# Patient Record
Sex: FEMALE | Race: WHITE | Employment: FULL TIME | URBAN - METROPOLITAN AREA
[De-identification: names, ages, dates, MRNs, and addresses within clinical notes are randomized per-mention and may not be internally consistent; named-entity substitution may affect disease eponyms.]

---

## 2017-08-16 ENCOUNTER — TELEPHONE (OUTPATIENT)
Dept: BARIATRICS/WEIGHT MGMT | Age: 37
End: 2017-08-16

## 2021-09-22 VITALS — HEIGHT: 68 IN | WEIGHT: 275 LBS | BODY MASS INDEX: 41.68 KG/M2

## 2021-09-22 RX ORDER — SODIUM CHLORIDE, SODIUM LACTATE, POTASSIUM CHLORIDE, CALCIUM CHLORIDE 600; 310; 30; 20 MG/100ML; MG/100ML; MG/100ML; MG/100ML
INJECTION, SOLUTION INTRAVENOUS CONTINUOUS
Status: CANCELLED | OUTPATIENT
Start: 2021-09-22

## 2021-09-22 RX ORDER — LOSARTAN POTASSIUM 25 MG/1
25 TABLET ORAL DAILY
COMMUNITY

## 2021-09-22 RX ORDER — IBUPROFEN 800 MG/1
800 TABLET ORAL EVERY 6 HOURS PRN
Status: ON HOLD | COMMUNITY
End: 2021-10-07 | Stop reason: HOSPADM

## 2021-09-22 RX ORDER — DILTIAZEM HYDROCHLORIDE 240 MG/1
240 CAPSULE, COATED, EXTENDED RELEASE ORAL DAILY
COMMUNITY

## 2021-09-22 RX ORDER — TIZANIDINE HYDROCHLORIDE 4 MG/1
4 CAPSULE, GELATIN COATED ORAL NIGHTLY PRN
Status: ON HOLD | COMMUNITY
End: 2021-10-07 | Stop reason: HOSPADM

## 2021-09-22 RX ORDER — HYDROXYZINE HYDROCHLORIDE 25 MG/1
25 TABLET, FILM COATED ORAL EVERY 6 HOURS PRN
COMMUNITY

## 2021-09-22 RX ORDER — FUROSEMIDE 40 MG/1
40 TABLET ORAL DAILY
COMMUNITY

## 2021-09-22 RX ORDER — LIDOCAINE HYDROCHLORIDE 10 MG/ML
0.5 INJECTION, SOLUTION EPIDURAL; INFILTRATION; INTRACAUDAL; PERINEURAL ONCE
Status: CANCELLED | OUTPATIENT
Start: 2021-09-22 | End: 2021-09-22

## 2021-09-22 RX ORDER — SERTRALINE HYDROCHLORIDE 100 MG/1
100 TABLET, FILM COATED ORAL DAILY
COMMUNITY

## 2021-09-22 RX ORDER — BUSPIRONE HYDROCHLORIDE 10 MG/1
10 TABLET ORAL 2 TIMES DAILY
COMMUNITY

## 2021-09-22 RX ORDER — FLECAINIDE ACETATE 100 MG/1
100 TABLET ORAL 2 TIMES DAILY
COMMUNITY

## 2021-09-22 NOTE — PROGRESS NOTES
Name_______________________________________Printed:____________________  Date and time of surgery___10/5/21 @ 0730_____________________Arrival Time:__0600 main hosp______________   1. The instructions given regarding when and if a patient needs to stop oral intake prior to surgery varies. Follow the specific instructions you were given                  _x__Nothing to eat or to drink after Midnight the night before.                   ____Carbo loading or ERAS instructions will be given to select patients-if you have been given those instructions -please do the following                           The evening before your surgery after dinner before midnight drink 40 ounces of gatorade. If you are diabetic use sugar free. The morning of surgery drink 40 ounces of water. This needs to be finished 3 hours prior to your surgery start time. 2. Take the following pills with a small sip of water on the morning of surgery___prednisone, synthroid, prilosec, cardizem,  flecanide________________________________________________                  Do not take blood pressure medications ending in pril or sartan the cira prior to surgery or the morning of surgery_   3. Aspirin, Ibuprofen, Advil, Naproxen, Vitamin E and other Anti-inflammatory products and supplements should be stopped for 5 -7days before surgery or as directed by your physician. 4. Check with your Doctor regarding stopping Plavix, Coumadin,Eliquis, Lovenox,Effient,Pradaxa,Xarelto, Fragmin or other blood thinners and follow their instructions. 5. Do not smoke, and do not drink any alcoholic beverages 24 hours prior to surgery. This includes NA Beer. Refrain from the usage of any recreational drugs. 6. You may brush your teeth and gargle the morning of surgery. DO NOT SWALLOW WATER   7. You MUST make arrangements for a responsible adult to stay on site while you are here and take you home after your surgery.  You will not be allowed to leave alone or drive yourself insurance card. 19.  Visit our web site for additional information:  [email or web address]              20.During flu season no children under the age of 15 are permitted in the hospital for the safety of all patients. 21. If you take a long acting insulin in the evening only  take half of your usual  dose the night  before your procedure              22. If you use a c-pap please bring DOS if staying overnight,             23.For your convenience [facility] has a pharmacy on site to fill your prescriptions. 24. If you use oxygen and have a portable tank please bring it  with you the DOS             25. Bring a complete list of all your medications with name and dose include any supplements. 26. Other__________________________________________   *Please call pre admission testing if you any further questions   Kathy Gentile   [address]    [place] [address] [place]  [phone]   [facility] [address] MIKE REDDY   [phone]           COVID TESTING    __x_ Covid test to be done 6 days prior to scheduled surgery -patient aware they are REQUIRED to bring a copy of the negative result DOS-if they receive a positive result to notify their surgeon         If known - indicate where patient is getting covid test done ________9/29 mff  ___________________________________________________    ___ Rapid - DOS    ___ Other___________________________________      Maral Sequeira POLICY(subject to change)    There is a one visitor policy at [facility] for all surgeries and endoscopies. Whether the visitor can stay or will be asked to wait in the car will depend on the current policy and if social distancing can be maintained. The policy is subject to change at any time. Please make sure the visitor has a cell phone that is on,charged and able to accept calls, as this may be the way that the staff communicates with them. Pain management is NO VISITOR policyThe patients ride is expected to remain in the car with a cell phone for communication. If the ride is leaving the hospital grounds please make sure they are back in time for pickup. Have the patient inform the staff on arrival what their rides plans are while the patient is in the facility. At the MAIN there is one visitor allowed. Please note that the visitor policy is subject to change. All above information reviewed with patient in person or by phone. Patient verbalizes understanding. All questions and concerns addressed.                                                                                                  Patient/Rep____pt________________                                                                                                                                    PRE OP INSTRUCTIONS

## 2021-09-29 ENCOUNTER — HOSPITAL ENCOUNTER (OUTPATIENT)
Age: 41
Setting detail: OUTPATIENT SURGERY
Discharge: HOME | End: 2021-09-29
Attending: NEUROLOGICAL SURGERY | Admitting: NEUROLOGICAL SURGERY
Payer: COMMERCIAL

## 2021-09-29 ENCOUNTER — HOSPITAL ENCOUNTER (OUTPATIENT)
Age: 41
Discharge: HOME | End: 2021-09-29
Attending: NEUROLOGICAL SURGERY
Payer: COMMERCIAL

## 2021-09-29 DIAGNOSIS — Z01.818 PREOP TESTING: Primary | ICD-10-CM

## 2021-09-29 DIAGNOSIS — Z01.818 PREOP TESTING: ICD-10-CM

## 2021-09-29 LAB
ABO/RH: NORMAL
ANION GAP SERPL CALCULATED.3IONS-SCNC: 17 MMOL/L (ref 3–16)
ANTIBODY SCREEN: NORMAL
APTT: 29.9 SEC (ref 26.2–38.6)
BUN BLDV-MCNC: 11 MG/DL (ref 7–20)
CALCIUM SERPL-MCNC: 9 MG/DL (ref 8.3–10.6)
CHLORIDE BLD-SCNC: 107 MMOL/L (ref 99–110)
CO2: 18 MMOL/L (ref 21–32)
CREAT SERPL-MCNC: 0.8 MG/DL (ref 0.6–1.1)
GFR AFRICAN AMERICAN: >60
GFR NON-AFRICAN AMERICAN: >60
GLUCOSE BLD-MCNC: 89 MG/DL (ref 70–99)
HCT VFR BLD CALC: 37.3 % (ref 36–48)
HEMOGLOBIN: 11.8 G/DL (ref 12–16)
INR BLD: 0.9 (ref 0.88–1.12)
MCH RBC QN AUTO: 26.9 PG (ref 26–34)
MCHC RBC AUTO-ENTMCNC: 31.7 G/DL (ref 31–36)
MCV RBC AUTO: 85 FL (ref 80–100)
PDW BLD-RTO: 18.4 % (ref 12.4–15.4)
PLATELET # BLD: 337 K/UL (ref 135–450)
PMV BLD AUTO: 9.3 FL (ref 5–10.5)
POTASSIUM SERPL-SCNC: 4.2 MMOL/L (ref 3.5–5.1)
PROTHROMBIN TIME: 10.1 SEC (ref 9.9–12.7)
RBC # BLD: 4.38 M/UL (ref 4–5.2)
SODIUM BLD-SCNC: 142 MMOL/L (ref 136–145)
WBC # BLD: 6.1 K/UL (ref 4–11)

## 2021-09-29 PROCEDURE — 86900 BLOOD TYPING SEROLOGIC ABO: CPT

## 2021-09-29 PROCEDURE — U0003 INFECTIOUS AGENT DETECTION BY NUCLEIC ACID (DNA OR RNA); SEVERE ACUTE RESPIRATORY SYNDROME CORONAVIRUS 2 (SARS-COV-2) (CORONAVIRUS DISEASE [COVID-19]), AMPLIFIED PROBE TECHNIQUE, MAKING USE OF HIGH THROUGHPUT TECHNOLOGIES AS DESCRIBED BY CMS-2020-01-R: HCPCS

## 2021-09-29 PROCEDURE — 86901 BLOOD TYPING SEROLOGIC RH(D): CPT

## 2021-09-29 PROCEDURE — U0005 INFEC AGEN DETEC AMPLI PROBE: HCPCS

## 2021-09-29 PROCEDURE — 86850 RBC ANTIBODY SCREEN: CPT

## 2021-09-29 PROCEDURE — 85730 THROMBOPLASTIN TIME PARTIAL: CPT

## 2021-09-29 PROCEDURE — 80048 BASIC METABOLIC PNL TOTAL CA: CPT

## 2021-09-29 PROCEDURE — 85027 COMPLETE CBC AUTOMATED: CPT

## 2021-09-29 PROCEDURE — 36415 COLL VENOUS BLD VENIPUNCTURE: CPT

## 2021-09-29 PROCEDURE — 85610 PROTHROMBIN TIME: CPT

## 2021-09-30 LAB — SARS-COV-2, PCR: NOT DETECTED

## 2021-10-05 ENCOUNTER — HOSPITAL ENCOUNTER (INPATIENT)
Age: 41
LOS: 2 days | Discharge: HOME | DRG: 454 | End: 2021-10-07
Attending: NEUROLOGICAL SURGERY | Admitting: NEUROLOGICAL SURGERY
Payer: COMMERCIAL

## 2021-10-05 ENCOUNTER — APPOINTMENT (OUTPATIENT)
Dept: GENERAL RADIOLOGY | Age: 41
DRG: 454 | End: 2021-10-05
Attending: NEUROLOGICAL SURGERY
Payer: COMMERCIAL

## 2021-10-05 ENCOUNTER — ANESTHESIA EVENT (OUTPATIENT)
Dept: OPERATING ROOM | Age: 41
DRG: 454 | End: 2021-10-05
Payer: COMMERCIAL

## 2021-10-05 ENCOUNTER — ANESTHESIA (OUTPATIENT)
Dept: OPERATING ROOM | Age: 41
DRG: 454 | End: 2021-10-05
Payer: COMMERCIAL

## 2021-10-05 VITALS
RESPIRATION RATE: 15 BRPM | SYSTOLIC BLOOD PRESSURE: 145 MMHG | OXYGEN SATURATION: 100 % | DIASTOLIC BLOOD PRESSURE: 87 MMHG | TEMPERATURE: 98.2 F

## 2021-10-05 DIAGNOSIS — M43.16 SPONDYLOLISTHESIS AT L4-L5 LEVEL: Primary | ICD-10-CM

## 2021-10-05 PROBLEM — I10 HTN (HYPERTENSION): Status: ACTIVE | Noted: 2021-10-05

## 2021-10-05 LAB
ABO/RH: NORMAL
ANTIBODY SCREEN: NORMAL
HCG(URINE) PREGNANCY TEST: NEGATIVE

## 2021-10-05 PROCEDURE — 86901 BLOOD TYPING SEROLOGIC RH(D): CPT

## 2021-10-05 PROCEDURE — 72100 X-RAY EXAM L-S SPINE 2/3 VWS: CPT

## 2021-10-05 PROCEDURE — 0SB20ZZ EXCISION OF LUMBAR VERTEBRAL DISC, OPEN APPROACH: ICD-10-PCS | Performed by: NEUROLOGICAL SURGERY

## 2021-10-05 PROCEDURE — 6360000002 HC RX W HCPCS: Performed by: NEUROLOGICAL SURGERY

## 2021-10-05 PROCEDURE — 2500000003 HC RX 250 WO HCPCS: Performed by: NEUROLOGICAL SURGERY

## 2021-10-05 PROCEDURE — 1200000000 HC SEMI PRIVATE

## 2021-10-05 PROCEDURE — 0SG0071 FUSION OF LUMBAR VERTEBRAL JOINT WITH AUTOLOGOUS TISSUE SUBSTITUTE, POSTERIOR APPROACH, POSTERIOR COLUMN, OPEN APPROACH: ICD-10-PCS | Performed by: NEUROLOGICAL SURGERY

## 2021-10-05 PROCEDURE — 0SG00AJ FUSION OF LUMBAR VERTEBRAL JOINT WITH INTERBODY FUSION DEVICE, POSTERIOR APPROACH, ANTERIOR COLUMN, OPEN APPROACH: ICD-10-PCS | Performed by: NEUROLOGICAL SURGERY

## 2021-10-05 PROCEDURE — 01NB0ZZ RELEASE LUMBAR NERVE, OPEN APPROACH: ICD-10-PCS | Performed by: NEUROLOGICAL SURGERY

## 2021-10-05 PROCEDURE — 86850 RBC ANTIBODY SCREEN: CPT

## 2021-10-05 PROCEDURE — 2580000003 HC RX 258: Performed by: NEUROLOGICAL SURGERY

## 2021-10-05 PROCEDURE — 7100000001 HC PACU RECOVERY - ADDTL 15 MIN: Performed by: NEUROLOGICAL SURGERY

## 2021-10-05 PROCEDURE — 3700000000 HC ANESTHESIA ATTENDED CARE: Performed by: NEUROLOGICAL SURGERY

## 2021-10-05 PROCEDURE — 6370000000 HC RX 637 (ALT 250 FOR IP): Performed by: NURSE ANESTHETIST, CERTIFIED REGISTERED

## 2021-10-05 PROCEDURE — 6370000000 HC RX 637 (ALT 250 FOR IP): Performed by: NEUROLOGICAL SURGERY

## 2021-10-05 PROCEDURE — 2500000003 HC RX 250 WO HCPCS: Performed by: NURSE ANESTHETIST, CERTIFIED REGISTERED

## 2021-10-05 PROCEDURE — 2720000010 HC SURG SUPPLY STERILE: Performed by: NEUROLOGICAL SURGERY

## 2021-10-05 PROCEDURE — 6360000002 HC RX W HCPCS: Performed by: INTERNAL MEDICINE

## 2021-10-05 PROCEDURE — 84703 CHORIONIC GONADOTROPIN ASSAY: CPT

## 2021-10-05 PROCEDURE — 6360000002 HC RX W HCPCS: Performed by: FAMILY MEDICINE

## 2021-10-05 PROCEDURE — 94760 N-INVAS EAR/PLS OXIMETRY 1: CPT

## 2021-10-05 PROCEDURE — C1713 ANCHOR/SCREW BN/BN,TIS/BN: HCPCS | Performed by: NEUROLOGICAL SURGERY

## 2021-10-05 PROCEDURE — 3600000014 HC SURGERY LEVEL 4 ADDTL 15MIN: Performed by: NEUROLOGICAL SURGERY

## 2021-10-05 PROCEDURE — 7100000000 HC PACU RECOVERY - FIRST 15 MIN: Performed by: NEUROLOGICAL SURGERY

## 2021-10-05 PROCEDURE — 2709999900 HC NON-CHARGEABLE SUPPLY: Performed by: NEUROLOGICAL SURGERY

## 2021-10-05 PROCEDURE — 3600000004 HC SURGERY LEVEL 4 BASE: Performed by: NEUROLOGICAL SURGERY

## 2021-10-05 PROCEDURE — 2780000010 HC IMPLANT OTHER: Performed by: NEUROLOGICAL SURGERY

## 2021-10-05 PROCEDURE — 3700000001 HC ADD 15 MINUTES (ANESTHESIA): Performed by: NEUROLOGICAL SURGERY

## 2021-10-05 PROCEDURE — 36415 COLL VENOUS BLD VENIPUNCTURE: CPT

## 2021-10-05 PROCEDURE — C9290 INJ, BUPIVACAINE LIPOSOME: HCPCS | Performed by: NEUROLOGICAL SURGERY

## 2021-10-05 PROCEDURE — 3209999900 FLUORO FOR SURGICAL PROCEDURES

## 2021-10-05 PROCEDURE — 86900 BLOOD TYPING SEROLOGIC ABO: CPT

## 2021-10-05 PROCEDURE — 6360000002 HC RX W HCPCS: Performed by: NURSE ANESTHETIST, CERTIFIED REGISTERED

## 2021-10-05 PROCEDURE — 94150 VITAL CAPACITY TEST: CPT

## 2021-10-05 DEVICE — SCREW 54850046545 4.75VOYAGER ATS 6.5X45
Type: IMPLANTABLE DEVICE | Site: SPINE LUMBAR | Status: FUNCTIONAL
Brand: CD HORIZON® SOLERA® VOYAGER™ SPINAL SYSTEM

## 2021-10-05 DEVICE — ROD 641003040 40MM CAPPED ROD 4.75MM CCM
Type: IMPLANTABLE DEVICE | Site: SPINE LUMBAR | Status: FUNCTIONAL
Brand: CD HORIZON® SOLERA® SPINAL SYSTEM

## 2021-10-05 DEVICE — SPACER 4001027 6 DEG 10X27
Type: IMPLANTABLE DEVICE | Site: SPINE LUMBAR | Status: FUNCTIONAL
Brand: CAPSTONE CONTROL™ SPINAL SYSTEM

## 2021-10-05 DEVICE — SET SCR SPNL DIA4.75MM POST THORLUM SACR TI PERC APPRCH CDH: Type: IMPLANTABLE DEVICE | Site: SPINE LUMBAR | Status: FUNCTIONAL

## 2021-10-05 DEVICE — DBM 7509115 MAG SINGLE 1 X 5CM
Type: IMPLANTABLE DEVICE | Site: SPINE LUMBAR | Status: FUNCTIONAL
Brand: MAGNIFUSE® BONE GRAFT

## 2021-10-05 RX ORDER — SODIUM CHLORIDE 0.9 % (FLUSH) 0.9 %
5-40 SYRINGE (ML) INJECTION PRN
Status: DISCONTINUED | OUTPATIENT
Start: 2021-10-05 | End: 2021-10-07 | Stop reason: HOSPADM

## 2021-10-05 RX ORDER — PHENYLEPHRINE HCL IN 0.9% NACL 1 MG/10 ML
SYRINGE (ML) INTRAVENOUS PRN
Status: DISCONTINUED | OUTPATIENT
Start: 2021-10-05 | End: 2021-10-05 | Stop reason: SDUPTHER

## 2021-10-05 RX ORDER — PROPOFOL 10 MG/ML
INJECTION, EMULSION INTRAVENOUS PRN
Status: DISCONTINUED | OUTPATIENT
Start: 2021-10-05 | End: 2021-10-05 | Stop reason: SDUPTHER

## 2021-10-05 RX ORDER — EPHEDRINE SULFATE/0.9% NACL/PF 50 MG/5 ML
SYRINGE (ML) INTRAVENOUS PRN
Status: DISCONTINUED | OUTPATIENT
Start: 2021-10-05 | End: 2021-10-05 | Stop reason: SDUPTHER

## 2021-10-05 RX ORDER — ONDANSETRON 8 MG/1
8 TABLET, ORALLY DISINTEGRATING ORAL EVERY 8 HOURS PRN
Status: DISCONTINUED | OUTPATIENT
Start: 2021-10-05 | End: 2021-10-07 | Stop reason: HOSPADM

## 2021-10-05 RX ORDER — LEVOTHYROXINE SODIUM 0.1 MG/1
100 TABLET ORAL DAILY
Status: DISCONTINUED | OUTPATIENT
Start: 2021-10-06 | End: 2021-10-07 | Stop reason: HOSPADM

## 2021-10-05 RX ORDER — SODIUM CHLORIDE, SODIUM LACTATE, POTASSIUM CHLORIDE, CALCIUM CHLORIDE 600; 310; 30; 20 MG/100ML; MG/100ML; MG/100ML; MG/100ML
INJECTION, SOLUTION INTRAVENOUS CONTINUOUS
Status: DISCONTINUED | OUTPATIENT
Start: 2021-10-05 | End: 2021-10-05

## 2021-10-05 RX ORDER — FUROSEMIDE 40 MG/1
40 TABLET ORAL DAILY
Status: DISCONTINUED | OUTPATIENT
Start: 2021-10-06 | End: 2021-10-07 | Stop reason: HOSPADM

## 2021-10-05 RX ORDER — MIDAZOLAM HYDROCHLORIDE 1 MG/ML
INJECTION INTRAMUSCULAR; INTRAVENOUS PRN
Status: DISCONTINUED | OUTPATIENT
Start: 2021-10-05 | End: 2021-10-05 | Stop reason: SDUPTHER

## 2021-10-05 RX ORDER — MAGNESIUM HYDROXIDE 1200 MG/15ML
LIQUID ORAL CONTINUOUS PRN
Status: COMPLETED | OUTPATIENT
Start: 2021-10-05 | End: 2021-10-05

## 2021-10-05 RX ORDER — VANCOMYCIN HYDROCHLORIDE 1 G/20ML
INJECTION, POWDER, LYOPHILIZED, FOR SOLUTION INTRAVENOUS
Status: COMPLETED | OUTPATIENT
Start: 2021-10-05 | End: 2021-10-05

## 2021-10-05 RX ORDER — FENTANYL CITRATE 50 UG/ML
INJECTION, SOLUTION INTRAMUSCULAR; INTRAVENOUS PRN
Status: DISCONTINUED | OUTPATIENT
Start: 2021-10-05 | End: 2021-10-05 | Stop reason: SDUPTHER

## 2021-10-05 RX ORDER — OXYCODONE HYDROCHLORIDE 5 MG/1
5 TABLET ORAL PRN
Status: DISCONTINUED | OUTPATIENT
Start: 2021-10-05 | End: 2021-10-05 | Stop reason: HOSPADM

## 2021-10-05 RX ORDER — PANTOPRAZOLE SODIUM 40 MG/1
40 TABLET, DELAYED RELEASE ORAL DAILY
Status: DISCONTINUED | OUTPATIENT
Start: 2021-10-06 | End: 2021-10-07 | Stop reason: HOSPADM

## 2021-10-05 RX ORDER — FENTANYL CITRATE 50 UG/ML
50 INJECTION, SOLUTION INTRAMUSCULAR; INTRAVENOUS EVERY 5 MIN PRN
Status: DISCONTINUED | OUTPATIENT
Start: 2021-10-05 | End: 2021-10-05 | Stop reason: HOSPADM

## 2021-10-05 RX ORDER — SUCCINYLCHOLINE/SOD CL,ISO/PF 200MG/10ML
SYRINGE (ML) INTRAVENOUS PRN
Status: DISCONTINUED | OUTPATIENT
Start: 2021-10-05 | End: 2021-10-05 | Stop reason: SDUPTHER

## 2021-10-05 RX ORDER — PREDNISONE 1 MG/1
5 TABLET ORAL DAILY
Status: DISCONTINUED | OUTPATIENT
Start: 2021-10-05 | End: 2021-10-07 | Stop reason: HOSPADM

## 2021-10-05 RX ORDER — POLYETHYLENE GLYCOL 3350 17 G/17G
17 POWDER, FOR SOLUTION ORAL DAILY
Status: DISCONTINUED | OUTPATIENT
Start: 2021-10-05 | End: 2021-10-07 | Stop reason: HOSPADM

## 2021-10-05 RX ORDER — KETAMINE HCL IN NACL, ISO-OSM 100MG/10ML
SYRINGE (ML) INJECTION PRN
Status: DISCONTINUED | OUTPATIENT
Start: 2021-10-05 | End: 2021-10-05 | Stop reason: SDUPTHER

## 2021-10-05 RX ORDER — OXYCODONE HYDROCHLORIDE 5 MG/1
10 TABLET ORAL EVERY 4 HOURS PRN
Status: DISCONTINUED | OUTPATIENT
Start: 2021-10-05 | End: 2021-10-07 | Stop reason: HOSPADM

## 2021-10-05 RX ORDER — BUSPIRONE HYDROCHLORIDE 5 MG/1
10 TABLET ORAL 2 TIMES DAILY
Status: DISCONTINUED | OUTPATIENT
Start: 2021-10-05 | End: 2021-10-07 | Stop reason: HOSPADM

## 2021-10-05 RX ORDER — DEXMEDETOMIDINE HYDROCHLORIDE 100 UG/ML
INJECTION, SOLUTION INTRAVENOUS PRN
Status: DISCONTINUED | OUTPATIENT
Start: 2021-10-05 | End: 2021-10-05 | Stop reason: SDUPTHER

## 2021-10-05 RX ORDER — METHOCARBAMOL 750 MG/1
750 TABLET, FILM COATED ORAL 4 TIMES DAILY
Status: DISCONTINUED | OUTPATIENT
Start: 2021-10-05 | End: 2021-10-07 | Stop reason: HOSPADM

## 2021-10-05 RX ORDER — CLINDAMYCIN PHOSPHATE 900 MG/50ML
900 INJECTION INTRAVENOUS EVERY 8 HOURS
Status: COMPLETED | OUTPATIENT
Start: 2021-10-05 | End: 2021-10-06

## 2021-10-05 RX ORDER — BUPIVACAINE HYDROCHLORIDE 5 MG/ML
INJECTION, SOLUTION EPIDURAL; INTRACAUDAL
Status: COMPLETED | OUTPATIENT
Start: 2021-10-05 | End: 2021-10-05

## 2021-10-05 RX ORDER — LOSARTAN POTASSIUM 25 MG/1
25 TABLET ORAL DAILY
Status: DISCONTINUED | OUTPATIENT
Start: 2021-10-06 | End: 2021-10-07 | Stop reason: HOSPADM

## 2021-10-05 RX ORDER — DEXAMETHASONE SODIUM PHOSPHATE 4 MG/ML
INJECTION, SOLUTION INTRA-ARTICULAR; INTRALESIONAL; INTRAMUSCULAR; INTRAVENOUS; SOFT TISSUE PRN
Status: DISCONTINUED | OUTPATIENT
Start: 2021-10-05 | End: 2021-10-05 | Stop reason: SDUPTHER

## 2021-10-05 RX ORDER — HYDROMORPHONE HYDROCHLORIDE 1 MG/ML
0.5 INJECTION, SOLUTION INTRAMUSCULAR; INTRAVENOUS; SUBCUTANEOUS
Status: DISCONTINUED | OUTPATIENT
Start: 2021-10-05 | End: 2021-10-05

## 2021-10-05 RX ORDER — CLINDAMYCIN PHOSPHATE 900 MG/50ML
900 INJECTION INTRAVENOUS
Status: COMPLETED | OUTPATIENT
Start: 2021-10-05 | End: 2021-10-05

## 2021-10-05 RX ORDER — HYDRALAZINE HYDROCHLORIDE 20 MG/ML
10 INJECTION INTRAMUSCULAR; INTRAVENOUS EVERY 6 HOURS PRN
Status: DISCONTINUED | OUTPATIENT
Start: 2021-10-05 | End: 2021-10-07 | Stop reason: HOSPADM

## 2021-10-05 RX ORDER — CLINDAMYCIN PHOSPHATE 900 MG/50ML
INJECTION INTRAVENOUS
Status: COMPLETED
Start: 2021-10-05 | End: 2021-10-05

## 2021-10-05 RX ORDER — HYDROXYZINE HYDROCHLORIDE 25 MG/1
25 TABLET, FILM COATED ORAL EVERY 6 HOURS PRN
Status: DISCONTINUED | OUTPATIENT
Start: 2021-10-05 | End: 2021-10-07 | Stop reason: HOSPADM

## 2021-10-05 RX ORDER — HYDROMORPHONE HCL 110MG/55ML
0.25 PATIENT CONTROLLED ANALGESIA SYRINGE INTRAVENOUS EVERY 5 MIN PRN
Status: DISCONTINUED | OUTPATIENT
Start: 2021-10-05 | End: 2021-10-05 | Stop reason: HOSPADM

## 2021-10-05 RX ORDER — LEVONORGESTREL AND ETHINYL ESTRADIOL 6-5-10
1 KIT ORAL DAILY
Status: DISCONTINUED | OUTPATIENT
Start: 2021-10-06 | End: 2021-10-07 | Stop reason: HOSPADM

## 2021-10-05 RX ORDER — VECURONIUM BROMIDE 1 MG/ML
INJECTION, POWDER, LYOPHILIZED, FOR SOLUTION INTRAVENOUS PRN
Status: DISCONTINUED | OUTPATIENT
Start: 2021-10-05 | End: 2021-10-05 | Stop reason: SDUPTHER

## 2021-10-05 RX ORDER — HYDROMORPHONE HYDROCHLORIDE 1 MG/ML
1 INJECTION, SOLUTION INTRAMUSCULAR; INTRAVENOUS; SUBCUTANEOUS
Status: DISCONTINUED | OUTPATIENT
Start: 2021-10-05 | End: 2021-10-07 | Stop reason: HOSPADM

## 2021-10-05 RX ORDER — DIPHENHYDRAMINE HYDROCHLORIDE 50 MG/ML
12.5 INJECTION INTRAMUSCULAR; INTRAVENOUS
Status: DISCONTINUED | OUTPATIENT
Start: 2021-10-05 | End: 2021-10-05 | Stop reason: HOSPADM

## 2021-10-05 RX ORDER — DEXTROSE, SODIUM CHLORIDE, AND POTASSIUM CHLORIDE 5; .45; .15 G/100ML; G/100ML; G/100ML
1000 INJECTION INTRAVENOUS CONTINUOUS
Status: DISCONTINUED | OUTPATIENT
Start: 2021-10-05 | End: 2021-10-06

## 2021-10-05 RX ORDER — DILTIAZEM HYDROCHLORIDE 240 MG/1
240 CAPSULE, COATED, EXTENDED RELEASE ORAL DAILY
Status: DISCONTINUED | OUTPATIENT
Start: 2021-10-06 | End: 2021-10-07 | Stop reason: HOSPADM

## 2021-10-05 RX ORDER — OXYCODONE HYDROCHLORIDE 5 MG/1
5 TABLET ORAL EVERY 4 HOURS PRN
Status: DISCONTINUED | OUTPATIENT
Start: 2021-10-05 | End: 2021-10-07 | Stop reason: HOSPADM

## 2021-10-05 RX ORDER — FLECAINIDE ACETATE 100 MG/1
100 TABLET ORAL 2 TIMES DAILY
Status: DISCONTINUED | OUTPATIENT
Start: 2021-10-05 | End: 2021-10-07 | Stop reason: HOSPADM

## 2021-10-05 RX ORDER — OXYCODONE HYDROCHLORIDE 5 MG/1
10 TABLET ORAL PRN
Status: DISCONTINUED | OUTPATIENT
Start: 2021-10-05 | End: 2021-10-05 | Stop reason: HOSPADM

## 2021-10-05 RX ORDER — POTASSIUM CHLORIDE 20 MEQ/1
40 TABLET, EXTENDED RELEASE ORAL PRN
Status: DISCONTINUED | OUTPATIENT
Start: 2021-10-05 | End: 2021-10-07 | Stop reason: HOSPADM

## 2021-10-05 RX ORDER — 0.9 % SODIUM CHLORIDE 0.9 %
500 INTRAVENOUS SOLUTION INTRAVENOUS PRN
Status: DISCONTINUED | OUTPATIENT
Start: 2021-10-05 | End: 2021-10-07 | Stop reason: HOSPADM

## 2021-10-05 RX ORDER — ONDANSETRON 2 MG/ML
INJECTION INTRAMUSCULAR; INTRAVENOUS PRN
Status: DISCONTINUED | OUTPATIENT
Start: 2021-10-05 | End: 2021-10-05 | Stop reason: SDUPTHER

## 2021-10-05 RX ORDER — POTASSIUM CHLORIDE 7.45 MG/ML
10 INJECTION INTRAVENOUS PRN
Status: DISCONTINUED | OUTPATIENT
Start: 2021-10-05 | End: 2021-10-07 | Stop reason: HOSPADM

## 2021-10-05 RX ORDER — CETIRIZINE HYDROCHLORIDE 10 MG/1
10 TABLET ORAL DAILY
Status: DISCONTINUED | OUTPATIENT
Start: 2021-10-06 | End: 2021-10-07 | Stop reason: HOSPADM

## 2021-10-05 RX ORDER — LIDOCAINE HYDROCHLORIDE 20 MG/ML
INJECTION, SOLUTION EPIDURAL; INFILTRATION; INTRACAUDAL; PERINEURAL PRN
Status: DISCONTINUED | OUTPATIENT
Start: 2021-10-05 | End: 2021-10-05 | Stop reason: SDUPTHER

## 2021-10-05 RX ORDER — CARBOXYMETHYLCELLULOSE SODIUM 10 MG/ML
GEL OPHTHALMIC PRN
Status: DISCONTINUED | OUTPATIENT
Start: 2021-10-05 | End: 2021-10-05 | Stop reason: SDUPTHER

## 2021-10-05 RX ORDER — LIDOCAINE HYDROCHLORIDE 10 MG/ML
0.5 INJECTION, SOLUTION EPIDURAL; INFILTRATION; INTRACAUDAL; PERINEURAL ONCE
Status: DISCONTINUED | OUTPATIENT
Start: 2021-10-05 | End: 2021-10-05 | Stop reason: HOSPADM

## 2021-10-05 RX ORDER — LABETALOL HYDROCHLORIDE 5 MG/ML
5 INJECTION, SOLUTION INTRAVENOUS EVERY 10 MIN PRN
Status: DISCONTINUED | OUTPATIENT
Start: 2021-10-05 | End: 2021-10-05 | Stop reason: HOSPADM

## 2021-10-05 RX ORDER — MEPERIDINE HYDROCHLORIDE 25 MG/ML
12.5 INJECTION INTRAMUSCULAR; INTRAVENOUS; SUBCUTANEOUS EVERY 5 MIN PRN
Status: DISCONTINUED | OUTPATIENT
Start: 2021-10-05 | End: 2021-10-05 | Stop reason: HOSPADM

## 2021-10-05 RX ORDER — HYDROMORPHONE HCL 110MG/55ML
0.5 PATIENT CONTROLLED ANALGESIA SYRINGE INTRAVENOUS EVERY 5 MIN PRN
Status: DISCONTINUED | OUTPATIENT
Start: 2021-10-05 | End: 2021-10-05 | Stop reason: HOSPADM

## 2021-10-05 RX ORDER — HYDROMORPHONE HCL 110MG/55ML
PATIENT CONTROLLED ANALGESIA SYRINGE INTRAVENOUS PRN
Status: DISCONTINUED | OUTPATIENT
Start: 2021-10-05 | End: 2021-10-05 | Stop reason: SDUPTHER

## 2021-10-05 RX ORDER — SODIUM CHLORIDE 0.9 % (FLUSH) 0.9 %
5-40 SYRINGE (ML) INJECTION EVERY 12 HOURS SCHEDULED
Status: DISCONTINUED | OUTPATIENT
Start: 2021-10-05 | End: 2021-10-07 | Stop reason: HOSPADM

## 2021-10-05 RX ORDER — ZOLPIDEM TARTRATE 5 MG/1
5 TABLET ORAL NIGHTLY PRN
Status: DISCONTINUED | OUTPATIENT
Start: 2021-10-05 | End: 2021-10-07 | Stop reason: HOSPADM

## 2021-10-05 RX ORDER — SODIUM CHLORIDE 9 MG/ML
25 INJECTION, SOLUTION INTRAVENOUS PRN
Status: DISCONTINUED | OUTPATIENT
Start: 2021-10-05 | End: 2021-10-07 | Stop reason: HOSPADM

## 2021-10-05 RX ORDER — MAGNESIUM SULFATE HEPTAHYDRATE 500 MG/ML
INJECTION, SOLUTION INTRAMUSCULAR; INTRAVENOUS PRN
Status: DISCONTINUED | OUTPATIENT
Start: 2021-10-05 | End: 2021-10-05 | Stop reason: SDUPTHER

## 2021-10-05 RX ADMIN — BUSPIRONE HYDROCHLORIDE 10 MG: 5 TABLET ORAL at 20:53

## 2021-10-05 RX ADMIN — HYDROMORPHONE HYDROCHLORIDE 0.4 MG: 2 INJECTION, SOLUTION INTRAMUSCULAR; INTRAVENOUS; SUBCUTANEOUS at 10:48

## 2021-10-05 RX ADMIN — HYDROMORPHONE HYDROCHLORIDE 0.4 MG: 2 INJECTION, SOLUTION INTRAMUSCULAR; INTRAVENOUS; SUBCUTANEOUS at 10:50

## 2021-10-05 RX ADMIN — DEXAMETHASONE SODIUM PHOSPHATE 8 MG: 4 INJECTION, SOLUTION INTRAMUSCULAR; INTRAVENOUS at 08:00

## 2021-10-05 RX ADMIN — ZOLPIDEM TARTRATE 5 MG: 5 TABLET ORAL at 22:38

## 2021-10-05 RX ADMIN — Medication 10 ML: at 22:38

## 2021-10-05 RX ADMIN — Medication 100 MCG: at 08:54

## 2021-10-05 RX ADMIN — SODIUM CHLORIDE, POTASSIUM CHLORIDE, SODIUM LACTATE AND CALCIUM CHLORIDE: 600; 310; 30; 20 INJECTION, SOLUTION INTRAVENOUS at 07:23

## 2021-10-05 RX ADMIN — Medication 140 MG: at 07:43

## 2021-10-05 RX ADMIN — DEXMEDETOMIDINE HYDROCHLORIDE 5 MCG: 100 INJECTION, SOLUTION INTRAVENOUS at 09:27

## 2021-10-05 RX ADMIN — HYDROMORPHONE HYDROCHLORIDE 0.4 MG: 2 INJECTION, SOLUTION INTRAMUSCULAR; INTRAVENOUS; SUBCUTANEOUS at 09:31

## 2021-10-05 RX ADMIN — Medication 200 MCG: at 08:39

## 2021-10-05 RX ADMIN — VECURONIUM BROMIDE 1 MG: 1 INJECTION, POWDER, LYOPHILIZED, FOR SOLUTION INTRAVENOUS at 10:13

## 2021-10-05 RX ADMIN — HYDROMORPHONE HYDROCHLORIDE 0.5 MG: 1 INJECTION, SOLUTION INTRAMUSCULAR; INTRAVENOUS; SUBCUTANEOUS at 16:00

## 2021-10-05 RX ADMIN — VECURONIUM BROMIDE 1 MG: 1 INJECTION, POWDER, LYOPHILIZED, FOR SOLUTION INTRAVENOUS at 09:31

## 2021-10-05 RX ADMIN — OXYCODONE 10 MG: 5 TABLET ORAL at 16:55

## 2021-10-05 RX ADMIN — HYDROMORPHONE HYDROCHLORIDE 0.5 MG: 2 INJECTION, SOLUTION INTRAMUSCULAR; INTRAVENOUS; SUBCUTANEOUS at 12:24

## 2021-10-05 RX ADMIN — VECURONIUM BROMIDE 2 MG: 1 INJECTION, POWDER, LYOPHILIZED, FOR SOLUTION INTRAVENOUS at 08:39

## 2021-10-05 RX ADMIN — SUGAMMADEX 250 MG: 100 INJECTION, SOLUTION INTRAVENOUS at 11:04

## 2021-10-05 RX ADMIN — LIDOCAINE HYDROCHLORIDE 80 MG: 20 INJECTION, SOLUTION EPIDURAL; INFILTRATION; INTRACAUDAL; PERINEURAL at 07:43

## 2021-10-05 RX ADMIN — CARBOXYMETHYLCELLULOSE SODIUM 1 DROP: 10 GEL OPHTHALMIC at 07:44

## 2021-10-05 RX ADMIN — Medication 10 MG: at 09:58

## 2021-10-05 RX ADMIN — FENTANYL CITRATE 50 MCG: 50 INJECTION, SOLUTION INTRAMUSCULAR; INTRAVENOUS at 07:43

## 2021-10-05 RX ADMIN — VECURONIUM BROMIDE 5 MG: 1 INJECTION, POWDER, LYOPHILIZED, FOR SOLUTION INTRAVENOUS at 07:50

## 2021-10-05 RX ADMIN — Medication 100 MCG: at 08:15

## 2021-10-05 RX ADMIN — HYDROMORPHONE HYDROCHLORIDE 0.5 MG: 1 INJECTION, SOLUTION INTRAMUSCULAR; INTRAVENOUS; SUBCUTANEOUS at 20:54

## 2021-10-05 RX ADMIN — FENTANYL CITRATE 50 MCG: 50 INJECTION, SOLUTION INTRAMUSCULAR; INTRAVENOUS at 09:27

## 2021-10-05 RX ADMIN — HYDROMORPHONE HYDROCHLORIDE 0.4 MG: 2 INJECTION, SOLUTION INTRAMUSCULAR; INTRAVENOUS; SUBCUTANEOUS at 10:44

## 2021-10-05 RX ADMIN — Medication 100 MCG: at 08:35

## 2021-10-05 RX ADMIN — Medication 10 MG: at 09:34

## 2021-10-05 RX ADMIN — VECURONIUM BROMIDE 1 MG: 1 INJECTION, POWDER, LYOPHILIZED, FOR SOLUTION INTRAVENOUS at 09:48

## 2021-10-05 RX ADMIN — OXYCODONE 10 MG: 5 TABLET ORAL at 21:45

## 2021-10-05 RX ADMIN — ONDANSETRON 4 MG: 2 INJECTION INTRAMUSCULAR; INTRAVENOUS at 10:33

## 2021-10-05 RX ADMIN — MIDAZOLAM 2 MG: 1 INJECTION INTRAMUSCULAR; INTRAVENOUS at 07:36

## 2021-10-05 RX ADMIN — METHOCARBAMOL 750 MG: 750 TABLET ORAL at 16:00

## 2021-10-05 RX ADMIN — VECURONIUM BROMIDE 1 MG: 1 INJECTION, POWDER, LYOPHILIZED, FOR SOLUTION INTRAVENOUS at 09:04

## 2021-10-05 RX ADMIN — FENTANYL CITRATE 50 MCG: 50 INJECTION, SOLUTION INTRAMUSCULAR; INTRAVENOUS at 11:10

## 2021-10-05 RX ADMIN — VECURONIUM BROMIDE 1 MG: 1 INJECTION, POWDER, LYOPHILIZED, FOR SOLUTION INTRAVENOUS at 07:43

## 2021-10-05 RX ADMIN — MAGNESIUM SULFATE HEPTAHYDRATE 1 G: 500 INJECTION, SOLUTION INTRAMUSCULAR; INTRAVENOUS at 08:05

## 2021-10-05 RX ADMIN — HYDROMORPHONE HYDROCHLORIDE 0.5 MG: 2 INJECTION, SOLUTION INTRAMUSCULAR; INTRAVENOUS; SUBCUTANEOUS at 11:45

## 2021-10-05 RX ADMIN — Medication 20 MG: at 08:00

## 2021-10-05 RX ADMIN — HYDROMORPHONE HYDROCHLORIDE 0.5 MG: 2 INJECTION, SOLUTION INTRAMUSCULAR; INTRAVENOUS; SUBCUTANEOUS at 13:20

## 2021-10-05 RX ADMIN — Medication 100 MCG: at 09:06

## 2021-10-05 RX ADMIN — VECURONIUM BROMIDE 2 MG: 1 INJECTION, POWDER, LYOPHILIZED, FOR SOLUTION INTRAVENOUS at 08:06

## 2021-10-05 RX ADMIN — FLECAINIDE ACETATE 100 MG: 100 TABLET ORAL at 20:53

## 2021-10-05 RX ADMIN — HYDROMORPHONE HYDROCHLORIDE 0.2 MG: 2 INJECTION, SOLUTION INTRAMUSCULAR; INTRAVENOUS; SUBCUTANEOUS at 10:13

## 2021-10-05 RX ADMIN — Medication 100 MCG: at 08:18

## 2021-10-05 RX ADMIN — DEXMEDETOMIDINE HYDROCHLORIDE 15 MCG: 100 INJECTION, SOLUTION INTRAVENOUS at 08:05

## 2021-10-05 RX ADMIN — PROPOFOL 200 MG: 10 INJECTION, EMULSION INTRAVENOUS at 07:43

## 2021-10-05 RX ADMIN — CLINDAMYCIN PHOSPHATE 900 MG: 900 INJECTION, SOLUTION INTRAVENOUS at 23:40

## 2021-10-05 RX ADMIN — POTASSIUM CHLORIDE, DEXTROSE MONOHYDRATE AND SODIUM CHLORIDE 1000 ML: 150; 5; 450 INJECTION, SOLUTION INTRAVENOUS at 13:13

## 2021-10-05 RX ADMIN — HYDROMORPHONE HYDROCHLORIDE 1 MG: 1 INJECTION, SOLUTION INTRAMUSCULAR; INTRAVENOUS; SUBCUTANEOUS at 23:37

## 2021-10-05 RX ADMIN — DEXMEDETOMIDINE HYDROCHLORIDE 5 MCG: 100 INJECTION, SOLUTION INTRAVENOUS at 10:34

## 2021-10-05 RX ADMIN — CLINDAMYCIN PHOSPHATE 900 MG: 900 INJECTION, SOLUTION INTRAVENOUS at 16:04

## 2021-10-05 RX ADMIN — HYDROMORPHONE HYDROCHLORIDE 0.2 MG: 2 INJECTION, SOLUTION INTRAMUSCULAR; INTRAVENOUS; SUBCUTANEOUS at 09:58

## 2021-10-05 RX ADMIN — CLINDAMYCIN PHOSPHATE 900 MG: 900 INJECTION, SOLUTION INTRAVENOUS at 07:48

## 2021-10-05 RX ADMIN — METHOCARBAMOL 750 MG: 750 TABLET ORAL at 20:53

## 2021-10-05 ASSESSMENT — PULMONARY FUNCTION TESTS
PIF_VALUE: 23
PIF_VALUE: 22
PIF_VALUE: 23
PIF_VALUE: 20
PIF_VALUE: 22
PIF_VALUE: 22
PIF_VALUE: 0
PIF_VALUE: 21
PIF_VALUE: 23
PIF_VALUE: 22
PIF_VALUE: 21
PIF_VALUE: 22
PIF_VALUE: 21
PIF_VALUE: 20
PIF_VALUE: 21
PIF_VALUE: 23
PIF_VALUE: 22
PIF_VALUE: 21
PIF_VALUE: 23
PIF_VALUE: 22
PIF_VALUE: 21
PIF_VALUE: 23
PIF_VALUE: 22
PIF_VALUE: 22
PIF_VALUE: 20
PIF_VALUE: 23
PIF_VALUE: 21
PIF_VALUE: 22
PIF_VALUE: 22
PIF_VALUE: 16
PIF_VALUE: 0
PIF_VALUE: 22
PIF_VALUE: 21
PIF_VALUE: 5
PIF_VALUE: 23
PIF_VALUE: 20
PIF_VALUE: 22
PIF_VALUE: 22
PIF_VALUE: 15
PIF_VALUE: 21
PIF_VALUE: 23
PIF_VALUE: 22
PIF_VALUE: 21
PIF_VALUE: 22
PIF_VALUE: 23
PIF_VALUE: 23
PIF_VALUE: 21
PIF_VALUE: 21
PIF_VALUE: 22
PIF_VALUE: 4
PIF_VALUE: 21
PIF_VALUE: 5
PIF_VALUE: 25
PIF_VALUE: 4
PIF_VALUE: 22
PIF_VALUE: 4
PIF_VALUE: 24
PIF_VALUE: 23
PIF_VALUE: 22
PIF_VALUE: 23
PIF_VALUE: 3
PIF_VALUE: 22
PIF_VALUE: 20
PIF_VALUE: 22
PIF_VALUE: 21
PIF_VALUE: 23
PIF_VALUE: 23
PIF_VALUE: 22
PIF_VALUE: 21
PIF_VALUE: 23
PIF_VALUE: 21
PIF_VALUE: 16
PIF_VALUE: 22
PIF_VALUE: 24
PIF_VALUE: 21
PIF_VALUE: 21
PIF_VALUE: 23
PIF_VALUE: 22
PIF_VALUE: 21
PIF_VALUE: 20
PIF_VALUE: 22
PIF_VALUE: 23
PIF_VALUE: 21
PIF_VALUE: 22
PIF_VALUE: 23
PIF_VALUE: 21
PIF_VALUE: 23
PIF_VALUE: 23
PIF_VALUE: 21
PIF_VALUE: 22
PIF_VALUE: 21
PIF_VALUE: 23
PIF_VALUE: 22
PIF_VALUE: 23
PIF_VALUE: 5
PIF_VALUE: 23
PIF_VALUE: 20
PIF_VALUE: 23
PIF_VALUE: 23
PIF_VALUE: 6
PIF_VALUE: 22
PIF_VALUE: 3
PIF_VALUE: 22
PIF_VALUE: 4
PIF_VALUE: 24
PIF_VALUE: 22
PIF_VALUE: 21
PIF_VALUE: 24
PIF_VALUE: 23
PIF_VALUE: 22
PIF_VALUE: 21
PIF_VALUE: 23
PIF_VALUE: 23
PIF_VALUE: 22
PIF_VALUE: 3
PIF_VALUE: 21
PIF_VALUE: 21
PIF_VALUE: 23
PIF_VALUE: 22
PIF_VALUE: 4
PIF_VALUE: 21
PIF_VALUE: 22
PIF_VALUE: 23
PIF_VALUE: 21
PIF_VALUE: 22
PIF_VALUE: 23
PIF_VALUE: 23
PIF_VALUE: 22
PIF_VALUE: 22
PIF_VALUE: 2
PIF_VALUE: 23
PIF_VALUE: 22
PIF_VALUE: 23
PIF_VALUE: 22
PIF_VALUE: 24
PIF_VALUE: 22
PIF_VALUE: 23
PIF_VALUE: 22
PIF_VALUE: 22
PIF_VALUE: 21
PIF_VALUE: 23
PIF_VALUE: 22
PIF_VALUE: 23
PIF_VALUE: 22
PIF_VALUE: 23
PIF_VALUE: 22
PIF_VALUE: 21
PIF_VALUE: 23
PIF_VALUE: 35
PIF_VALUE: 22
PIF_VALUE: 22
PIF_VALUE: 23
PIF_VALUE: 22
PIF_VALUE: 21
PIF_VALUE: 21
PIF_VALUE: 22
PIF_VALUE: 22
PIF_VALUE: 21
PIF_VALUE: 1
PIF_VALUE: 22
PIF_VALUE: 21
PIF_VALUE: 2
PIF_VALUE: 22
PIF_VALUE: 2
PIF_VALUE: 22
PIF_VALUE: 2
PIF_VALUE: 21
PIF_VALUE: 22
PIF_VALUE: 21
PIF_VALUE: 23
PIF_VALUE: 22
PIF_VALUE: 21
PIF_VALUE: 22
PIF_VALUE: 23
PIF_VALUE: 21
PIF_VALUE: 23
PIF_VALUE: 23
PIF_VALUE: 22
PIF_VALUE: 21
PIF_VALUE: 22
PIF_VALUE: 22
PIF_VALUE: 21
PIF_VALUE: 23
PIF_VALUE: 22
PIF_VALUE: 2
PIF_VALUE: 21
PIF_VALUE: 2

## 2021-10-05 ASSESSMENT — PAIN SCALES - GENERAL
PAINLEVEL_OUTOF10: 8
PAINLEVEL_OUTOF10: 10
PAINLEVEL_OUTOF10: 7
PAINLEVEL_OUTOF10: 8
PAINLEVEL_OUTOF10: 7
PAINLEVEL_OUTOF10: 10
PAINLEVEL_OUTOF10: 4
PAINLEVEL_OUTOF10: 10
PAINLEVEL_OUTOF10: 4
PAINLEVEL_OUTOF10: 7
PAINLEVEL_OUTOF10: 8
PAINLEVEL_OUTOF10: 4
PAINLEVEL_OUTOF10: 7
PAINLEVEL_OUTOF10: 8

## 2021-10-05 ASSESSMENT — PAIN DESCRIPTION - DESCRIPTORS: DESCRIPTORS: NUMBNESS;SHOOTING

## 2021-10-05 ASSESSMENT — PAIN - FUNCTIONAL ASSESSMENT: PAIN_FUNCTIONAL_ASSESSMENT: 0-10

## 2021-10-05 NOTE — ANESTHESIA PRE PROCEDURE
Department of Anesthesiology  Preprocedure Note       Name:  Dayanna Murguia   Age:  39 y.o.  :  1980                                          MRN:  [id number]         Date:  10/5/2021      Surgeon: Lara Cordon):  Jose Kelly MD    Procedure: Procedure(s):  RIGHT LUMBAR4-LUMBAR5 TRANSVERSE LUMBAR INTERBODY FUSION    Medications prior to admission:   Prior to Admission medications    Medication Sig Start Date End Date Taking?  Authorizing Provider   flecainide (TAMBOCOR) 100 MG tablet Take 100 mg by mouth 2 times daily   Yes Historical Provider, MD   dilTIAZem (CARDIZEM CD) 240 MG extended release capsule Take 240 mg by mouth daily   Yes Historical Provider, MD   losartan (COZAAR) 25 MG tablet Take 25 mg by mouth daily   Yes Historical Provider, MD   furosemide (LASIX) 40 MG tablet Take 40 mg by mouth daily   Yes Historical Provider, MD   sertraline (ZOLOFT) 100 MG tablet Take 100 mg by mouth daily   Yes Historical Provider, MD   busPIRone (BUSPAR) 10 MG tablet Take 10 mg by mouth 2 times daily   Yes Historical Provider, MD   hydrOXYzine (ATARAX) 25 MG tablet Take 25 mg by mouth every 6 hours as needed for Itching   Yes Historical Provider, MD   tiZANidine (ZANAFLEX) 4 MG capsule Take 4 mg by mouth nightly as needed for Muscle spasms   Yes Historical Provider, MD   Methocarbamol (ROBAXIN-750 PO) Take by mouth as needed   Yes Historical Provider, MD   belimumab (BENLYSTA) 120 MG injection Infuse 120 mg intravenously once monthly   Yes Historical Provider, MD   ondansetron (ZOFRAN ODT) 4 MG disintegrating tablet Take 2 tablets by mouth every 8 hours as needed for Nausea 16  Yes Demetria Houser MD   zolpidem (AMBIEN) 5 MG tablet Take 5 mg by mouth nightly as needed for Sleep   Yes Historical Provider, MD Bartolome Leonard per tablet Take by mouth daily  16  Yes Historical Provider, MD   omeprazole (PRILOSEC) 40 MG capsule Take by mouth daily  16  Yes Historical Provider, MD   hydroxychloroquine (PLAQUENIL) 200 MG tablet Take 1 tablet by mouth 2 times daily. 4/15/14  Yes Alma Marino MD   predniSONE (DELTASONE) 5 MG tablet Take 1 tablet by mouth daily. 4/15/14  Yes Alma Marino MD   cetirizine (ZYRTEC) 10 MG tablet Take 10 mg by mouth daily. Yes Historical Provider, MD   levothyroxine (SYNTHROID) 100 MCG tablet Take 100 mcg by mouth daily. Yes Historical Provider, MD   ibuprofen (ADVIL;MOTRIN) 800 MG tablet Take 800 mg by mouth every 6 hours as needed for Pain    Historical Provider, MD   aspirin 81 MG tablet Take 81 mg by mouth daily    Historical Provider, MD   lidocaine viscous (XYLOCAINE) 2 % solution Take 5 mLs by mouth 3 times daily as needed for Pain. 12/27/13   Alma Marino MD   multivitamin [facility]) per tablet Take 1 tablet by mouth daily. Historical Provider, MD       Current medications:    Current Facility-Administered Medications   Medication Dose Route Frequency Provider Last Rate Last Admin    lactated ringers infusion   IntraVENous Continuous Jolynn Oliveira MD        lidocaine PF 1 % injection 0.5 mL  0.5 mL IntraDERmal Once Jolynn Oliveira MD           Allergies: Allergies   Allergen Reactions    Amoxicillin Shortness Of Breath    Other Other (See Comments)     Flu shot - legs go numb    Penicillins     Phenergan [Promethazine Hcl]      anxious    Sulfa Antibiotics Hives    Vicodin [Hydrocodone-Acetaminophen] Hives     Can take percocet         Problem List:    Patient Active Problem List   Diagnosis Code    Systemic lupus erythematosus ([facility] M32.9    LAP-BAND surgery status Z98.84    Non-intractable vomiting with nausea R11.2    Pharyngoesophageal dysphagia R13.14    Gastric band slippage K95.09    Chronic GERD K21.9    Severe obesity (BMI 35.0-39. 9) with comorbidity ([facility] [address] E66.01       Past Medical History:        Diagnosis Date    Allergic rhinitis     Anxiety     Depression     Fibromyalgia     Hives     Hypertension     Hypothyroidism     Psoriasis     PVC (premature ventricular contraction)     Raynaud disease     SLE (systemic lupus erythematosus) (HCC)     SVT (supraventricular tachycardia) (HCC)        Past Surgical History:        Procedure Laterality Date    CHOLECYSTECTOMY      CYSTOSCOPY      KNEE SURGERY Right 1999    Lateral release    LAP BAND      OTHER SURGICAL HISTORY  16    lap band removal    TONSILLECTOMY      UPPER GASTROINTESTINAL ENDOSCOPY  2016       Social History:    Social History     Tobacco Use    Smoking status: Former Smoker     Years: 5.00     Quit date: 1999     Years since quittin.6    Smokeless tobacco: Never Used   Substance Use Topics    Alcohol use: Yes     Alcohol/week: 0.0 standard drinks     Comment: occasional                                Counseling given: Not Answered      Vital Signs (Current): There were no vitals filed for this visit.                                            BP Readings from Last 3 Encounters:   16 114/81   16 (!) 136/91   16 116/76       NPO Status:                                                                                 BMI:   Wt Readings from Last 3 Encounters:   21 275 lb (124.7 kg)   16 268 lb (121.6 kg)   16 268 lb 1.3 oz (121.6 kg)     There is no height or weight on file to calculate BMI.    CBC:   Lab Results   Component Value Date    WBC 6.1 2021    RBC 4.38 2021    HGB 11.8 2021    HCT 37.3 2021    MCV 85.0 2021    RDW 18.4 2021     2021       CMP:   Lab Results   Component Value Date     2021    K 4.2 2021     2021    CO2 18 2021    BUN 11 2021    CREATININE 0.8 2021    GFRAA >60 2021    GFRAA >60 2013    AGRATIO 1.0 2016    LABGLOM >60 2021    LABGLOM 114 2011    LABGLOM 94 2011    GLUCOSE 89 2021    GLUCOSE 100 2013    PROT 7.8 2016    PROT 7.1 2013    CALCIUM 9.0 09/29/2021    BILITOT <0.2 02/17/2016    BILITOT 0.2 12/18/2013    ALKPHOS 80 02/17/2016    AST 17 02/17/2016    ALT 11 02/17/2016       POC Tests: No results for input(s): POCGLU, POCNA, POCK, POCCL, POCBUN, POCHEMO, POCHCT in the last 72 hours. Coags:   Lab Results   Component Value Date    PROTIME 10.1 09/29/2021    INR 0.90 09/29/2021    APTT 29.9 09/29/2021       HCG (If Applicable):   Lab Results   Component Value Date    PREGTESTUR Negative 02/24/2016        ABGs: No results found for: PHART, PO2ART, GMF9OJP, QZK5YQN, BEART, D3AOPDZD     Type & Screen (If Applicable):  No results found for: LABABO, LABRH    Drug/Infectious Status (If Applicable):  No results found for: HIV, HEPCAB    COVID-19 Screening (If Applicable):   Lab Results   Component Value Date    COVID19 Not Detected 09/29/2021           Anesthesia Evaluation    Airway: Mallampati: II  TM distance: >3 FB   Neck ROM: full  Mouth opening: > = 3 FB Dental:          Pulmonary:                              Cardiovascular:    (+) hypertension:,         Rhythm: regular  Rate: normal                    Neuro/Psych:   (+) neuromuscular disease:, psychiatric history:            GI/Hepatic/Renal:   (+) GERD:,           Endo/Other:    (+) hypothyroidism: arthritis:., .                 Abdominal:             Vascular: Other Findings:             Anesthesia Plan      general     ASA 3       Induction: intravenous. Anesthetic plan and risks discussed with patient. Plan discussed with CRNA.                   Ezra Kuo MD   10/5/2021

## 2021-10-05 NOTE — PROGRESS NOTES
Patient voided large amount clear yellow urine via bedpan, states numbness to left foot resolved, right pain numbness to RLE same as baseline. Await bed on 4 tower.

## 2021-10-05 NOTE — PROGRESS NOTES
Incentive Spirometry education and demonstration completed by Respiratory Therapy Yes      Response to education: Very Good     Teaching Time: 5 minutes    Minimum Predicted Vital Capacity - 616 mL. Patient's Actual Vital Capacity - 1750 mL. Turning over to Nursing for routine follow-up Yes.     Comments:    Electronically signed by Yisel Green RCP on 10/5/2021 at 7:16 PM

## 2021-10-05 NOTE — OP NOTE
[facility] OR  10/5/2021 11:01 AM    PATIENT NAME:         Chuck Salmeron  YOB: 1980   MEDICAL RECORD#         [id number]  SURGERY DATE:         10/5/2021  SURGEON:                 Conchita Bernard MD              OPERATIVE REPORT     PREOPERATIVE DIAGNOSES:  DDD with radiculopathy L45  Lumbar stenosis     POSTOPERATIVE DIAGNOSES:  Same     PROCEDURE PERFORMED:  1. Right L4-5 transforaminal lumbar interbody fusion with 9/12 mm 6 degree  PEEK allograft . 2.  Right L4-5 laminectomy, facetectomy, foraminotomy and diskectomy for decompression  of nerve roots. 3. L4-5 bilateral pedicle screw fixation with QM Power MIS with stereotactic navigation with O-arm image guidance. 4. L4-5 posterolateral fusion with DBX allograft, bone chips, bone marrow aspirate. 5.  Microscopic dissection. ASSISTANT:      ANESTHESIA:  General endotracheal.     ESTIMATED BLOOD LOSS:  100 mL. COMPLICATIONS:  None. INDICATIONS:   The patient is a Chuck Oneilg is a 39 y.o. female who presents with severe back and right leg pain. Symptoms have failed to resolve despite conservative intervention. MRI scan shows DDD and foraminal narrowing at L4-5. Based on findings and failure of conservative management, I recommended surgery with right L4-5 transforaminal lumbar interbody fusion. I reviewed the procedure with the patient including potential risks and benefits. After discussion, the patient decided to proceed with surgery and signed the informed consent to proceed. PROCEDURE IN DETAIL:  The patient was brought to the operating room. Patient received preoperative antibiotics. Sequential compression boots were placed on the patient's legs and activated. General endotracheal anesthesia was induced by the anesthesia team.  The patient was carefully turned prone on to the operating room table with a miguelina frame.   All pressure points were adequately padded and the patient was secured to the operating table. The back was cleansed with alcohol and prepped and draped in the usual sterile fashion. Incisions were marked out and planned for 3 cm off midline bilaterally. The posterior superior iliac spine on the left side was then identified and palpated. A small stab incision was made. I then placed the O-arm guide pin into the PSIS and tapped it into the bone then securing the O-arm star in place for stereotactic navigation using the Stealth system. An O-arm spin was then obtained with accuracy confirmed by checking landmarks. I then mapped out the L4 and L5 pedicles bilaterally and planned for 2 small paramedian incisions. Incisions were made with 15-blade knife through the skin. Bovie electrocautery was used to dissect through subcutaneous tissue down to fascia and fascia was split. Then with finger dissection I dissected down to transverse processes of L4 and L5. These were confirmed with the O-arm guidance probe. The transverse processes were then decorticated. I then placed pedicle screws using stereotactic navigationon the right side and tapped the left side by first placing drilling a  hole under image guidance and then tapping the pedicle and into the vertebral body approximately 2/3 the distance to the anterior cortex of the vertebral body. Screws of appropriate length, determined from the O-arm planning system, were placed on the left side and on the right side for the TLIF. I then used handheld retractors to dissect the muscle from under the ligament and then off the spinous process and lamina medially. Muscle was dissected off of the lamina and facet at L4-5 on the right side and medial and lateral retractors were placed with the Insight retractor system. Muscle was then cleaned off with the bovie to expose the lamina, facet and pars. O-arm probe was used to confirm localization. I then placed superior and inferior retractors to provide appropriate retraction.    The microscope was then brought in for microscopic dissection. Bovie electrocautery was used to dissect the remainder of muscle off of the facet and pars. I then drilled away the pars and facet decompressing the foramen. A bone  was used during drilling to preserve bone for fusion. Ligament and bony edges were then punched away completely decompressing the L4 and L5 nerves. The disk space was then exposed and once again level confirmed with the O-arm. Dura was retracted medially with a Brenda retractor. Epidural veins were coagulated with bipolar electrocautery. The disk was incised [id number]-xandk knife. Disk material was removed by pituitary rongeur. Curettes and robert were used within the disk space to remove all disk material, including disk extending to the opposite side. End plates were shaved with curettes and robert to prepare for fusion. Once the disk was emptied and the endplates were prepared, I tested the trials and a 9/12 mm trial fit well. I therefore prepared the 10/12 mm lordotic  PEEK graft and filled this with bone graft soaked with DBM. Prior to placing the graft, bone material from the bone  was placed through a funnel into the anterior portion of the disk space which was soaked in bone marrow aspirate. Graft was tapped in so as to be obliquely placed across the disk space and well countersunk and did fit quite snugly. FloSeal and bipolar was used for hemostasis and hemostasis was confirmed visually. The retractors were then removed. The microscope was then taken out. A 40/45 mm david was then placed through the towers on both sides and then secured down with caps. A second O-arm spin was then obtained and showed good placement of all hardware including all screws and the cage. Caps were fully tightened and locked on both sides and then the screw towers were removed. The wounds were then irrigated copiously with antibiotic solution.   Posterolateral fusion was performed by placing a mixture of DBX allograft and bone marrow aspirate through a funnel and Mastergraft allograft bone bags soaked in bone marrow aspirate down to the posterolateral space bilaterally. The O-arm star and guide pin were removed. The stab incision was then closed with 2-0 Vicryl suture to close subcutaneous tissues and dermis and 4-0 monocryl subcuticular stitch to close the skin. The paramedian wounds were then closed with 2-0 Vicryl suture to close the fascia followed by 2-0 Vicryl to close the dermis, followed by 4-0 Monocryl staples to close the skin. 0.5% Marcaine/Exparel was injected in deep tissue and subcutaneous tissue prior to closure. All drapes were then removed. The patient was turned back to the hospital bed and was awakened and taken to the recovery room in stable condition. All sponge, needle, and instrument counts were correct at the end of the case. In conformance with CMS regulations, I affirm I was present in the operating room during the entire procedure. Dictated by: Gisell Huffman.  Claudetta Potters, M.D.

## 2021-10-05 NOTE — PROGRESS NOTES
Patient resting comfortably, VSS and O2 sat maintained on 3L via NC. Pain tolerable. Lower back dressing CDI. MD aware of numbness/tingling to bilateral feet. Patient meets all phase 1 discharge criteria, seen by anesthesia. Will transfer to [address] [place] [address] when bed available.

## 2021-10-05 NOTE — PROGRESS NOTES
Patient admitted to PACU via stretcher, arouses to stimuli, moves ext to command. respirations adeq on 8L o2 per simple mask spo2 100%. Skin warm and dry with good color. abd soft. Back drsg dry and intact. Will continue to monitor.

## 2021-10-05 NOTE — PROGRESS NOTES
Patient states bilateral feet with numbness/tingling, preoperatively right foot only. Dr. Florentino Gregory notified via nurse in [address] and will notify him if need for assessment.

## 2021-10-05 NOTE — BRIEF OP NOTE
Brief Postoperative Note      Patient: Lisa Villarreal  YOB: 1980  MRN: [id number]    Date of Procedure: 10/5/2021    Pre-Op Diagnosis: M43.16 LUMBAR SPONDYLOLISTHESIS    Post-Op Diagnosis: Same       Procedure(s):  RIGHT LUMBAR4-LUMBAR5 TRANSVERSE LUMBAR INTERBODY FUSION    Surgeon(s):  Henok Davenport MD    Assistant:  Surgical Assistant: Demetrio Moffett Assistant: Carol Moreno    Anesthesia: General    Estimated Blood Loss (mL): Minimal    Complications: None    Specimens:   * No specimens in log *    Implants:  Implant Name Type Inv. Item Serial No.  Lot No. LRB No. Used Action   GRAFT BONE [facility] BONE MTRX [id number] MAGNIFUSE - [id number]  GRAFT BONE SGL [facility] BONE MTRX [id number] MAGNIFUSE [id number] [employer] [place] [employer]WD  Right 1 Implanted   ARTEMIO DBF WITH DELIVERY TUBES 6CC    [id number] [employer] SPINALGRAFT TECH-WD  Right 1 Implanted   SPACER [id number] DEG [id number]  SPACER [id number] DEG [id number]  [employer] SOFAMOR DANEK-WD [id number] Right 1 Implanted   SCREW SPNL L45MM [id number] AWL TAP HI STRENGTH LO PROF  SCREW SPNL L45MM [id number] AWL TAP HI STRENGTH LO PROF  [employer] [place] [employer]WD  Right 4 Implanted   SET SCR SPNL DIA4. 75MM POST THORLUM SACR TI PERC APPRCH [id number]  SET SCR SPNL DIA4. 75MM POST THORLUM SACR TI PERC APPRCH [address]  [employer] SOFAMOR DANEK-WD  Right 4 Implanted   ANUP SPNL L40MM [id number] CO CHROME MOLYBDENUM POST  ANUP SPNL L40MM [id number] CO CHROME MOLYBDENUM POST  [employer] [place] [employer]  Right 1 Implanted   ANUP SPNL L45MM [id number] CO CHROME MOLYBDENUM POST  ANUP SPNL L45MM [id number] CO CHROME MOLYBDENUM POST  [employer] [place] [employer]  Right 1 Implanted         Drains: * No LDAs found *    Findings: L45 DDD and stenosis with L45 listhesis    Electronically signed by Henok Davenport MD on 10/5/2021 at 10:50 AM

## 2021-10-05 NOTE — PROGRESS NOTES
Patient transferred to [address] [place] [address] in stable condition. Report at bedside.

## 2021-10-05 NOTE — ANESTHESIA POSTPROCEDURE EVALUATION
Department of Anesthesiology  Postprocedure Note    Patient: Queen Leanne  MRN: [id number]  YOB: 1980  Date of evaluation: 10/5/2021  Time:  12:13 PM     Procedure Summary     Date: 10/05/21 Room / Location: [address]    Anesthesia Start: 7074 Anesthesia Stop: 9585    Procedure: RIGHT LUMBAR4-LUMBAR5 TRANSVERSE LUMBAR INTERBODY FUSION (Right ) Diagnosis: (M43.16 LUMBAR SPONDYLOLISTHESIS)    Surgeons: Deandre Bejarano MD Responsible Provider: Nata Grijalva MD    Anesthesia Type: general ASA Status: 3          Anesthesia Type: general    Efrain Phase I: Efrain Score: 8    Efrain Phase II:      Last vitals: Reviewed and per EMR flowsheets.        Anesthesia Post Evaluation    Level of consciousness: awake  Complications: no

## 2021-10-05 NOTE — PROGRESS NOTES
Date of Surgery Update:  Carl Kee was seen, history and physical examination reviewed, and patient examined by me today. There have been no significant clinical changes since the completion of the previous history and physical.    The risk, benefits, and alternatives of the proposed procedure have been explained to the patient (or appropriate guardian) and understanding verbalized. All questions answered. Patient wishes to proceed.     Electronically signed by: Shorty Ge MD,10/5/2021,7:23 AM

## 2021-10-05 NOTE — CONSULTS
Consult -Internal Medicine  Dr. Maye Hollins  10/5/2021      PCP: Garett Oates MD  Referring Physician: Mariia Bland MD    Code Status: Full Code  Current Diet: ADULT DIET; Regular      Cc: Renzo Desai is a36 y.o. female who presents with HTN (hypertension). Active Hospital Problems    Diagnosis Date Noted    Systemic lupus erythematosus ([facility] [address] [M32.9] 05/04/2011     Priority: High    Spondylolisthesis at L4-L5 level [M43.16] 10/05/2021    HTN (hypertension) [I10] 10/05/2021    Chronic GERD [K21.9] 02/12/2016     HPI: Renzo Desai has been admitted by Mariia Bland MD with back and leg pain. 39 y.o. female who presents with severe back and right leg pain. Rated 9/10 at worst. Nothing makes it better  Symptoms have failed to resolve despite conservative intervention. worse with prolonged standing. MRI scan was reviewed by self, it shows DDD and foraminal narrowing at L4-5. Based on findings and failure of conservative management, surgery with right L4-5 transforaminal lumbar interbody fusion has been recommended    Pt has undergone above procedure without any apparentcomplications. Pt is doing well post operatively. Pain is controlled at this time. I have been asked to see the patient for evaluation of her internal medicine issues:  has a past medical history of Allergic rhinitis, Anxiety, Depression, Fibromyalgia, Hives, Hypertension, Hypothyroidism, Psoriasis, PVC (premature ventricular contraction), Raynaud disease, SLE (systemic lupus erythematosus) ([address] and SVT (supraventricular tachycardia) ([address] .  Home meds have been reveiwed and restartedas indicated. Pt denies having other complaints at this time.     Doing ok in recovery  Electronic chart reviewed  Home meds reviewed and restarted as indicated  Op note, anesthesia flowsheet reviewed  Recovery room tele strip reviewed  Case d/w nursing     Pain appears controlled  Has not yet worked with therapy  Problem list of hospitalization thus far: Active Hospital Problems    Diagnosis     Systemic lupus erythematosus (HCC) [M32.9]      Priority: High    Spondylolisthesis at L4-L5 level [M43.16]     HTN (hypertension) [I10]     Chronic GERD [K21.9]          Review of Systems: (1 system for EPF, 2-9 for detailed, 10+ for comprehensive)  Review of Systems   Constitutional: Negative for chills and fever. HENT: Negative for ear discharge and ear pain. Eyes: Negative for pain and redness. Respiratory: Negative for cough and shortness of breath. Cardiovascular: Negative for chest pain, palpitations and leg swelling. Gastrointestinal: Negative for nausea and vomiting. Endocrine: Negative for polydipsia and polyphagia. Genitourinary: Negative for frequency and urgency. Musculoskeletal: Positive for back pain. Negative for neck pain. Allergic/Immunologic: Negative for food allergies. Neurological: Negative for dizziness and light-headedness. Hematological: Negative for adenopathy. Psychiatric/Behavioral: Negative for agitation and decreased concentration.            Past Medical History:   Past Medical History:   Diagnosis Date    Allergic rhinitis     Anxiety     Depression     Fibromyalgia     Hives     Hypertension     Hypothyroidism     Psoriasis     PVC (premature ventricular contraction)     Raynaud disease     [facility]mat[facility]     SVT (supraventricular tachycardia) ([facility])        Past Surgical History:   Past Surgical History:   Procedure Laterality Date    CHOLECYSTECTOMY      CYSTOSCOPY      KNEE SURGERY Right 1999    Lateral release    LAP BAND      OTHER SURGICAL HISTORY  2/24/16    lap band removal    TONSILLECTOMY      UPPER GASTROINTESTINAL ENDOSCOPY  2/12/2016       Social History:   Social History     Tobacco History     Smoking Status  Former Smoker Quit date  2/12/1999 Smoking Frequency  For 5 years    Smokeless Tobacco Use  Never Used          Alcohol History     Alcohol Use Status  Yes Drinks/Week  0 Standard drinks or equivalent per week Amount  0.0 standard drinks of alcohol/wk Comment  occasional          Drug Use     Drug Use Status  No          Sexual Activity     Sexually Active  Not Asked                Fam History:   Family History   Problem Relation Age of Onset    Cancer Mother         colon       PFSH: The above PMHx, PSHx, SocHx, FamHx has been reviewed by myself. (1 area for detailed, 2-3 for comprehensive)      Code Status: Full Code    Meds  following list ofhome medications from electronic chart has been reviewed by myself  Prior to Admission medications    Medication Sig Start Date End Date Taking?  Authorizing Provider   flecainide (TAMBOCOR) 100 MG tablet Take 100 mg by mouth 2 times daily   Yes Historical Provider, MD   dilTIAZem (CARDIZEM CD) 240 MG extended release capsule Take 240 mg by mouth daily   Yes Historical Provider, MD   losartan (COZAAR) 25 MG tablet Take 25 mg by mouth daily   Yes Historical Provider, MD   furosemide (LASIX) 40 MG tablet Take 40 mg by mouth daily   Yes Historical Provider, MD   sertraline (ZOLOFT) 100 MG tablet Take 100 mg by mouth daily   Yes Historical Provider, MD   busPIRone (BUSPAR) 10 MG tablet Take 10 mg by mouth 2 times daily   Yes Historical Provider, MD   hydrOXYzine (ATARAX) 25 MG tablet Take 25 mg by mouth every 6 hours as needed for Itching   Yes Historical Provider, MD   tiZANidine (ZANAFLEX) 4 MG capsule Take 4 mg by mouth nightly as needed for Muscle spasms   Yes Historical Provider, MD   Methocarbamol (ROBAXIN-750 PO) Take by mouth as needed   Yes Historical Provider, MD   belimumab (BENLYSTA) 120 MG injection Infuse 120 mg intravenously once monthly   Yes Historical Provider, MD   ondansetron (ZOFRAN ODT) 4 MG disintegrating tablet Take 2 tablets by mouth every 8 hours as needed for Nausea 2/24/16  Yes Keagan Jose MD   zolpidem (AMBIEN) 5 MG tablet Take 5 mg by mouth nightly as needed for Sleep   Yes Historical Provider, MD Grover Tse per tablet Take by mouth daily  1/29/16  Yes Historical Provider, MD   omeprazole (PRILOSEC) 40 MG capsule Take by mouth daily  1/6/16  Yes Historical Provider, MD   hydroxychloroquine (PLAQUENIL) 200 MG tablet Take 1 tablet by mouth 2 times daily. 4/15/14  Yes Raina Laughlin MD   predniSONE (DELTASONE) 5 MG tablet Take 1 tablet by mouth daily. 4/15/14  Yes Raina Laughlin MD   cetirizine (ZYRTEC) 10 MG tablet Take 10 mg by mouth daily. Yes Historical Provider, MD   levothyroxine (SYNTHROID) 100 MCG tablet Take 100 mcg by mouth daily. Yes Historical Provider, MD   ibuprofen (ADVIL;MOTRIN) 800 MG tablet Take 800 mg by mouth every 6 hours as needed for Pain    Historical Provider, MD   aspirin 81 MG tablet Take 81 mg by mouth daily    Historical Provider, MD   lidocaine viscous (XYLOCAINE) 2 % solution Take 5 mLs by mouth 3 times daily as needed for Pain. 12/27/13   Raina Laughlin MD   multivitamin [facility]) per tablet Take 1 tablet by mouth daily.       Historical Provider, MD         Allergies   Allergen Reactions    Amoxicillin Shortness Of Breath    Keflex [Cephalexin] Other (See Comments)     Pain      Other Other (See Comments)     Flu shot - legs go numb    Penicillins     Phenergan [Promethazine Hcl]      anxious    Sulfa Antibiotics Hives    Vicodin [Hydrocodone-Acetaminophen] Hives     Can take percocet               EXAM: (2-7 system forEPF/Detailed, ?8 for Comprehensive)  /81   Pulse 94   Temp 98.4 °F (36.9 °C) (Oral)   Resp 16   Ht 5' 8\" (1.727 m)   Wt 284 lb 0.8 oz (128.8 kg)   SpO2 97%   BMI 43.19 kg/m²   Constitutional: vitals asabove: alert, appears stated age and cooperative  Psychiatric: normal insight and judgment, oriented to person, place, time, and general circumstances  Head: Normocephalic, without obvious abnormality, atraumatic  Eyes:lids and lashes normal, conjunctivae and sclerae normal and pupils equal, round, reactive to light and accomodation  EMNT: external ears normal, lips normal  Neck: no adenopathy, supple, symmetrical, trachea midline and thyroid not enlarged, symmetric, no tenderness/mass/nodules   Respiratory: clear to auscultation and percussion bilaterally with normal respiratory effort  Cardiovascular: normal rate, regular rhythm, normal S1 and S2 and no carotid bruits  Gastrointestinal: soft, non-tender, non-distended, normal bowel sounds, no masses or organomegaly  Lymphatic:   Extremities: no edema, no clubbing     Skin: No rashes or nodules noted. Neurologic:    LABS:  Labs Reviewed   PREGNANCY, URINE    Narrative:     Performed at:  [facility]  [address], [place]   Phone [phone]   TYPE AND SCREEN    Narrative:     Performed at:  [facility] [address]   Phone [phone]         IMAGING:  Imaging has been reviewed in the computerized chart  XR LUMBAR SPINE (2-3 VIEWS)    Result Date: 10/5/2021  EXAMINATION: 2 intraoperative SPOT FLUOROSCOPIC IMAGES and 2 intraoperative CTs 10/5/2021 7:24 am TECHNIQUE: Fluoroscopy was provided by the radiology department for procedure. Radiologist was not present during examination. FLUOROSCOPY DOSE AND TYPE OR TIME AND EXPOSURES: 9 seconds, 389 images (2 fluoro images and 2 intraoperative CTs). Total dose 1372.6 mGy. COMPARISON: None HISTORY: Intraprocedural imaging. Right lumbar L4-L5 transverse interbody fusion. FINDINGS: 2 spot images and 2 CT images of the lower lumbar spine were obtained. Radiologist was not in attendance. Images demonstrate placement of bilateral pedicle screws at L4 and L5. Intraprocedural fluoroscopic spot images and CTs as above. See separate procedure report for more information. FLUORO FOR SURGICAL PROCEDURES    Result Date: 10/5/2021  Radiology exam is complete. No Radiologist dictation.  Please follow up with ordering provider. MEDICAL DECISION MAKING:    Principal Problem:    HTN (hypertension) -Established problem. Stable. /81  Plan: Pt home BP meds reviewed and will be continued. IV Hydralazine ordered for control of extremely high blood pressures   Will monitor labs to assess Creat/K for possible complications of medications. Active Problems:    Systemic lupus erythematosus ([facility] [address] -Established problem. Stable. Plan: Continue present orders/plan. Chronic GERD -Established problem. Stable. Denies heartburn  Plan: cont on ppi    Spondylolisthesis at L4-L5 level -New Problem to me. Doing ok post op  Plan: Continue on post-operative pathway. PT/OT to see patient. Continue pain control - on IV pain meds acutely post op. Work on transitioning to oral pain meds when possible. Will follow serial h/h to monitor for acute blood loss anemia - labs ordered for tomorrow. Diagnoses as listed above, designated as new or established and plan outlined for each. Data Reviewed:   (1) Lab tests were reviewed or ordered. (1) Radiology tests were reviewed or ordered. (1) Medical test (Echo, EKG, PFT/sonia) were ordered. (1) History was not obtained from someone other than patient  (1) Old records  were reviewed - see HPI/MDM for pertinent details if review done. (2) Case was discussed with another health care provider: Dr Maribel Reynolds  (2) Imaging was viewed by myself. (2) EKG  was not viewed by myself. Thanks for the consult! Will follow along daily while patient is in house.       Kim De Souza MD  10/5/2021

## 2021-10-06 PROBLEM — D62 ACUTE BLOOD LOSS AS CAUSE OF POSTOPERATIVE ANEMIA: Status: ACTIVE | Noted: 2021-10-06

## 2021-10-06 LAB
ANION GAP SERPL CALCULATED.3IONS-SCNC: 11 MMOL/L (ref 3–16)
BASOPHILS ABSOLUTE: 0 K/UL (ref 0–0.2)
BASOPHILS RELATIVE PERCENT: 0.2 %
BUN BLDV-MCNC: 10 MG/DL (ref 7–20)
CALCIUM SERPL-MCNC: 8.6 MG/DL (ref 8.3–10.6)
CHLORIDE BLD-SCNC: 105 MMOL/L (ref 99–110)
CO2: 22 MMOL/L (ref 21–32)
CREAT SERPL-MCNC: 0.6 MG/DL (ref 0.6–1.1)
EOSINOPHILS ABSOLUTE: 0 K/UL (ref 0–0.6)
EOSINOPHILS RELATIVE PERCENT: 0 %
GFR AFRICAN AMERICAN: >60
GFR NON-AFRICAN AMERICAN: >60
GLUCOSE BLD-MCNC: 145 MG/DL (ref 70–99)
HCT VFR BLD CALC: 30.9 % (ref 36–48)
HEMOGLOBIN: 9.9 G/DL (ref 12–16)
LYMPHOCYTES ABSOLUTE: 0.4 K/UL (ref 1–5.1)
LYMPHOCYTES RELATIVE PERCENT: 5.3 %
MCH RBC QN AUTO: 27.3 PG (ref 26–34)
MCHC RBC AUTO-ENTMCNC: 32.1 G/DL (ref 31–36)
MCV RBC AUTO: 85.1 FL (ref 80–100)
MONOCYTES ABSOLUTE: 0.8 K/UL (ref 0–1.3)
MONOCYTES RELATIVE PERCENT: 10.8 %
NEUTROPHILS ABSOLUTE: 6.2 K/UL (ref 1.7–7.7)
NEUTROPHILS RELATIVE PERCENT: 83.7 %
PDW BLD-RTO: 17.9 % (ref 12.4–15.4)
PLATELET # BLD: 290 K/UL (ref 135–450)
PMV BLD AUTO: 8.9 FL (ref 5–10.5)
POTASSIUM SERPL-SCNC: 4.5 MMOL/L (ref 3.5–5.1)
RBC # BLD: 3.63 M/UL (ref 4–5.2)
SODIUM BLD-SCNC: 138 MMOL/L (ref 136–145)
WBC # BLD: 7.4 K/UL (ref 4–11)

## 2021-10-06 PROCEDURE — 97530 THERAPEUTIC ACTIVITIES: CPT

## 2021-10-06 PROCEDURE — 97116 GAIT TRAINING THERAPY: CPT

## 2021-10-06 PROCEDURE — 2580000003 HC RX 258: Performed by: NEUROLOGICAL SURGERY

## 2021-10-06 PROCEDURE — 6370000000 HC RX 637 (ALT 250 FOR IP): Performed by: NURSE PRACTITIONER

## 2021-10-06 PROCEDURE — 97535 SELF CARE MNGMENT TRAINING: CPT

## 2021-10-06 PROCEDURE — 97166 OT EVAL MOD COMPLEX 45 MIN: CPT

## 2021-10-06 PROCEDURE — 80048 BASIC METABOLIC PNL TOTAL CA: CPT

## 2021-10-06 PROCEDURE — 36415 COLL VENOUS BLD VENIPUNCTURE: CPT

## 2021-10-06 PROCEDURE — 6370000000 HC RX 637 (ALT 250 FOR IP): Performed by: NEUROLOGICAL SURGERY

## 2021-10-06 PROCEDURE — 97162 PT EVAL MOD COMPLEX 30 MIN: CPT

## 2021-10-06 PROCEDURE — 1200000000 HC SEMI PRIVATE

## 2021-10-06 PROCEDURE — 6370000000 HC RX 637 (ALT 250 FOR IP): Performed by: INTERNAL MEDICINE

## 2021-10-06 PROCEDURE — 6360000002 HC RX W HCPCS: Performed by: INTERNAL MEDICINE

## 2021-10-06 PROCEDURE — 85025 COMPLETE CBC W/AUTO DIFF WBC: CPT

## 2021-10-06 PROCEDURE — 2500000003 HC RX 250 WO HCPCS: Performed by: NEUROLOGICAL SURGERY

## 2021-10-06 RX ORDER — DIPHENHYDRAMINE HCL 25 MG
25 TABLET ORAL EVERY 6 HOURS PRN
Status: DISCONTINUED | OUTPATIENT
Start: 2021-10-06 | End: 2021-10-07 | Stop reason: HOSPADM

## 2021-10-06 RX ORDER — ACETAMINOPHEN 325 MG/1
650 TABLET ORAL EVERY 6 HOURS
Status: DISCONTINUED | OUTPATIENT
Start: 2021-10-06 | End: 2021-10-07 | Stop reason: HOSPADM

## 2021-10-06 RX ADMIN — DILTIAZEM HYDROCHLORIDE 240 MG: 240 CAPSULE, COATED, EXTENDED RELEASE ORAL at 08:23

## 2021-10-06 RX ADMIN — POLYETHYLENE GLYCOL 3350 17 G: 17 POWDER, FOR SOLUTION ORAL at 08:23

## 2021-10-06 RX ADMIN — DIPHENHYDRAMINE HYDROCHLORIDE 25 MG: 25 TABLET ORAL at 23:06

## 2021-10-06 RX ADMIN — METHOCARBAMOL 750 MG: 750 TABLET ORAL at 13:26

## 2021-10-06 RX ADMIN — SERTRALINE 100 MG: 50 TABLET, FILM COATED ORAL at 08:24

## 2021-10-06 RX ADMIN — PANTOPRAZOLE SODIUM 40 MG: 40 TABLET, DELAYED RELEASE ORAL at 08:25

## 2021-10-06 RX ADMIN — METHOCARBAMOL 750 MG: 750 TABLET ORAL at 08:23

## 2021-10-06 RX ADMIN — OXYCODONE 10 MG: 5 TABLET ORAL at 04:01

## 2021-10-06 RX ADMIN — HYDROMORPHONE HYDROCHLORIDE 1 MG: 1 INJECTION, SOLUTION INTRAMUSCULAR; INTRAVENOUS; SUBCUTANEOUS at 20:09

## 2021-10-06 RX ADMIN — ZOLPIDEM TARTRATE 5 MG: 5 TABLET ORAL at 23:06

## 2021-10-06 RX ADMIN — OXYCODONE 10 MG: 5 TABLET ORAL at 15:47

## 2021-10-06 RX ADMIN — LOSARTAN POTASSIUM 25 MG: 25 TABLET, FILM COATED ORAL at 08:23

## 2021-10-06 RX ADMIN — LEVOTHYROXINE SODIUM 100 MCG: 0.1 TABLET ORAL at 08:24

## 2021-10-06 RX ADMIN — PREDNISONE 5 MG: 5 TABLET ORAL at 08:24

## 2021-10-06 RX ADMIN — FLECAINIDE ACETATE 100 MG: 100 TABLET ORAL at 08:24

## 2021-10-06 RX ADMIN — HYDROMORPHONE HYDROCHLORIDE 1 MG: 1 INJECTION, SOLUTION INTRAMUSCULAR; INTRAVENOUS; SUBCUTANEOUS at 11:13

## 2021-10-06 RX ADMIN — METHOCARBAMOL 750 MG: 750 TABLET ORAL at 16:22

## 2021-10-06 RX ADMIN — CETIRIZINE HYDROCHLORIDE 10 MG: 10 TABLET, FILM COATED ORAL at 08:25

## 2021-10-06 RX ADMIN — BUSPIRONE HYDROCHLORIDE 10 MG: 5 TABLET ORAL at 08:24

## 2021-10-06 RX ADMIN — DIPHENHYDRAMINE HYDROCHLORIDE 25 MG: 25 TABLET ORAL at 15:47

## 2021-10-06 RX ADMIN — BUSPIRONE HYDROCHLORIDE 10 MG: 5 TABLET ORAL at 20:06

## 2021-10-06 RX ADMIN — FLECAINIDE ACETATE 100 MG: 100 TABLET ORAL at 20:07

## 2021-10-06 RX ADMIN — OXYCODONE 10 MG: 5 TABLET ORAL at 23:05

## 2021-10-06 RX ADMIN — POTASSIUM CHLORIDE, DEXTROSE MONOHYDRATE AND SODIUM CHLORIDE 1000 ML: 150; 5; 450 INJECTION, SOLUTION INTRAVENOUS at 11:56

## 2021-10-06 RX ADMIN — METHOCARBAMOL 750 MG: 750 TABLET ORAL at 20:07

## 2021-10-06 RX ADMIN — ACETAMINOPHEN 650 MG: 325 TABLET ORAL at 15:47

## 2021-10-06 RX ADMIN — BISACODYL 5 MG: 5 TABLET, COATED ORAL at 23:06

## 2021-10-06 RX ADMIN — Medication 10 ML: at 08:31

## 2021-10-06 RX ADMIN — ACETAMINOPHEN 650 MG: 325 TABLET ORAL at 20:06

## 2021-10-06 RX ADMIN — Medication 10 ML: at 20:07

## 2021-10-06 RX ADMIN — OXYCODONE 10 MG: 5 TABLET ORAL at 08:23

## 2021-10-06 ASSESSMENT — PAIN SCALES - GENERAL
PAINLEVEL_OUTOF10: 7
PAINLEVEL_OUTOF10: 0
PAINLEVEL_OUTOF10: 7
PAINLEVEL_OUTOF10: 10
PAINLEVEL_OUTOF10: 7
PAINLEVEL_OUTOF10: 7

## 2021-10-06 ASSESSMENT — ENCOUNTER SYMPTOMS
VOMITING: 0
NAUSEA: 0
SHORTNESS OF BREATH: 0
COUGH: 0
EYE PAIN: 0
BACK PAIN: 1
EYE REDNESS: 0

## 2021-10-06 ASSESSMENT — PAIN DESCRIPTION - ONSET
ONSET: ON-GOING
ONSET: ON-GOING

## 2021-10-06 ASSESSMENT — PAIN DESCRIPTION - LOCATION
LOCATION: BACK

## 2021-10-06 ASSESSMENT — PAIN DESCRIPTION - FREQUENCY
FREQUENCY: CONTINUOUS
FREQUENCY: CONTINUOUS

## 2021-10-06 ASSESSMENT — PAIN DESCRIPTION - PAIN TYPE
TYPE: SURGICAL PAIN

## 2021-10-06 ASSESSMENT — PAIN DESCRIPTION - PROGRESSION
CLINICAL_PROGRESSION: NOT CHANGED
CLINICAL_PROGRESSION: NOT CHANGED

## 2021-10-06 ASSESSMENT — PAIN DESCRIPTION - ORIENTATION
ORIENTATION: LOWER
ORIENTATION: LOWER;MID

## 2021-10-06 ASSESSMENT — PAIN DESCRIPTION - DESCRIPTORS
DESCRIPTORS: SORE;ACHING
DESCRIPTORS: SORE;ACHING;CONSTANT

## 2021-10-06 NOTE — PROGRESS NOTES
%   10/05/21 1230 119/65   85 10 94 %   10/05/21 1215 123/69   86 10 95 %   10/05/21 1200 135/61   85 9 90 %   10/05/21 1145 (!) 126/94   89 25 100 %   10/05/21 1135 (!) 145/75 97.3 °F (36.3 °C) Temporal 92 18 100 %   10/05/21 1130 (!) 147/68   93 14 100 %   10/05/21 1125 (!) 150/71   90 9 100 %   10/05/21 1120 (!) 150/71 97.6 °F (36.4 °C) Temporal 90 10 100 %     Patient Vitals for the past 96 hrs (Last 3 readings):   Weight   10/05/21 0710 284 lb 0.8 oz (128.8 kg)         Intake/Output Summary (Last 24 hours) at 10/6/2021 0835  Last data filed at 10/6/2021 0243  Gross per 24 hour   Intake 1500 ml   Output 400 ml   Net 1100 ml         Physical Exam:  /71   Pulse 88   Temp 98.4 °F (36.9 °C) (Oral)   Resp 16   Ht 5' 8\" (1.727 m)   Wt 284 lb 0.8 oz (128.8 kg)   SpO2 95%   BMI 43.19 kg/m²   General appearance: alert, appears stated age and cooperative  Head: Normocephalic, without obvious abnormality, atraumatic  Lungs: clear to auscultation bilaterally  Heart: regular rate and rhythm, S1, S2 normal, no murmur, click, rub or gallop  Abdomen: soft, non-tender; bowel sounds normal; no masses,  no organomegaly  Extremities: extremities normal, atraumatic, no cyanosis or edema    Labs:  Lab Results   Component Value Date    WBC 7.4 10/06/2021    HGB 9.9 (L) 10/06/2021    HCT 30.9 (L) 10/06/2021     10/06/2021    CHOL 189 02/17/2016    TRIG 98 02/17/2016    HDL 57 02/17/2016    ALT 11 02/17/2016    AST 17 02/17/2016     10/06/2021    K 4.5 10/06/2021     10/06/2021    CREATININE 0.6 10/06/2021    BUN 10 10/06/2021    CO2 22 10/06/2021    TSH 4.87 (H) 02/17/2016    INR 0.90 09/29/2021    LABA1C 5.4 02/17/2016    LABMICR YES 04/15/2014     No results found for: CKTOTAL, CKMB, CKMBINDEX, TROPONINI    Imaging: All recent imaging studies reviewed in computerized chart.   XR LUMBAR SPINE (2-3 VIEWS)    Result Date: 10/5/2021  EXAMINATION: 2 intraoperative SPOT FLUOROSCOPIC IMAGES and 2 intraoperative CTs 10/5/2021 7:24 am TECHNIQUE: Fluoroscopy was provided by the radiology department for procedure. Radiologist was not present during examination. FLUOROSCOPY DOSE AND TYPE OR TIME AND EXPOSURES: 9 seconds, 389 images (2 fluoro images and 2 intraoperative CTs). Total dose 1372.6 mGy. COMPARISON: None HISTORY: Intraprocedural imaging. Right lumbar L4-L5 transverse interbody fusion. FINDINGS: 2 spot images and 2 CT images of the lower lumbar spine were obtained. Radiologist was not in attendance. Images demonstrate placement of bilateral pedicle screws at L4 and L5. Intraprocedural fluoroscopic spot images and CTs as above. See separate procedure report for more information. FLUORO FOR SURGICAL PROCEDURES    Result Date: 10/5/2021  Radiology exam is complete. No Radiologist dictation. Please follow up with ordering provider. Lab Results   Component Value Date    GLUCOSE 145 10/06/2021    GLUCOSE 100 12/18/2013     Lab Results   Component Value Date    POCGLU 70 02/24/2016       Assessment and Plan:  Principal Problem:    HTN (hypertension) -Established problem. Stable. 118/74  Plan: stay on same meds  Active Problems:    Systemic lupus erythematosus ([facility] [address] -Established problem. Stable. Plan: Continue present orders/plan. Chronic GERD -Established problem. Stable. No issues  Plan: cont on ppi    Spondylolisthesis at L4-L5 level -Established problem. Stable. Some pain issues overnight  Plan: stay on post op pathway. To work with pt/ot today. Transition to oral pain meds. Cont to follow h/h to assess post op anemia. Acute blood loss as cause of postoperative anemia -Established problem. Stable.  hgb 9.1  Plan: No indication for transfusion. Cont to monitor h/h to assess progression of anemia. Recommend ferrous sulfate or MVI as outpatient.      Disp - medically stable      Abraham Dillard MD  10/6/2021

## 2021-10-06 NOTE — PROGRESS NOTES
Shift assess complete, see flowsheets. Meds per order, see eMAR. Pt A&OX4. Ambulating to bathroom and tolerated without difficulty. Good urinary output. Pain well managed. The care plan and education has been reviewed and mutually agreed upon with the patient.      Electronically signed by Dominic Harris RN on 10/6/2021 at 2:59 AM

## 2021-10-06 NOTE — PROGRESS NOTES
Physical Therapy    Facility/Department: [address] ORTHO/NEURO NURSING  Initial Assessment    NAME: Sirena Teague  : 1980  MRN: [id number]    Date of Service: 10/6/2021    Discharge Recommendations:  Sirena Teague scored a 18/24 on the AM-PAC short mobility form. Current research shows that an AM-PAC score of 18 or greater is typically associated with a discharge to the patient's home setting. Based on the patient's AM-PAC score and their current functional mobility deficits, it is recommended that the patient have 2-3 sessions per week of Physical Therapy at d/c to increase the patient's independence. At this time, this patient demonstrates the endurance and safety to discharge home with home services) and a follow up treatment frequency of 2-3x/wk. Please see assessment section for further patient specific details. HOME HEALTH CARE: LEVEL 1 STANDARD    - Initial home health evaluation to occur within 24-48 hours, in patient home   - Therapy to evaluate with goal of regaining prior level of functioning   - Therapy to evaluate if patient has [address] needs for personal care    If patient discharges prior to next session this note will serve as a discharge summary. Please see below for the latest assessment towards goals. S Level 1   PT Equipment Recommendations  Equipment Needed: Yes  Mobility Devices: Canes  Cane: Straight Cane  Other: Needs SPC for safe ambulation upon discharge    Assessment   Body structures, Functions, Activity limitations: Decreased functional mobility ; Decreased endurance;Decreased balance  Assessment: Patient not at baseline function and would benefit from skilled PT to address above deficits and facilitate return to baseline function. Patient with multiple questions/concerns regradning managing self at home (elevated bed, stairs large to enter home, how to manage dogs) and currently using SPC in which she typically doesn't use.  Recommend home PT to [email or web address] concerns  Treatment Diagnosis: decreased functional mobility, impaird gait, decreased balance s/p lumbar spine surgery  Prognosis: Good  Decision Making: Medium Complexity  Clinical Presentation: evolving  PT Education: PT Role;Goals;Plan of Care;Precautions  Patient Education: safety with mobility, use of SPC, d/c recommendations - verbalized understanding  Barriers to Learning: none  REQUIRES PT FOLLOW UP: Yes  Activity Tolerance  Activity Tolerance: Patient Tolerated treatment well       Patient Diagnosis(es): There were no encounter diagnoses. has a past medical history of Allergic rhinitis, Anxiety, Depression, Fibromyalgia, Hives, Hypertension, Hypothyroidism, Psoriasis, PVC (premature ventricular contraction), Raynaud disease, SLE (systemic lupus erythematosus) ([facility] [address] and SVT (supraventricular tachycardia) ([facility] [address] has a past surgical history that includes Tonsillectomy; Cholecystectomy; Lap Band; Cystoscopy; knee surgery (Right, 1999); Upper gastrointestinal endoscopy (2/12/2016); other surgical history (2/24/16); and lumbar fusion (Right, 10/5/2021). Restrictions  Restrictions/Precautions  Restrictions/Precautions: Fall Risk (high fall risk)  Required Braces or Orthoses?: No  Position Activity Restriction  Other position/activity restrictions: RIGHT LUMBAR4-LUMBAR5 TRANSVERSE LUMBAR INTERBODY FUSION  Vision/Hearing  Vision: Impaired  Vision Exceptions: Wears glasses at all times  Hearing: Within functional limits     Subjective  General  Chart Reviewed: Yes  Family / Caregiver Present: No  Diagnosis: RIGHT LUMBAR4-LUMBAR5 TRANSVERSE LUMBAR INTERBODY FUSION  Follows Commands: Within Functional Limits  General Comment  Comments: supine in bed upon arrival  Subjective  Subjective: Denied pain at rest. Agreeable to therapy. No alarm present.   Pain Screening  Patient Currently in Pain: Denies (Denies pain at rest)          Orientation  Orientation  Overall Orientation Status: Within Functional Limits  Social/Functional History  Social/Functional History  Lives With: Family (plans to have someone stay with her for first week)  Type of Home: House  Home Layout: Bed/Bath upstairs, Two level  Home Access: Stairs to enter without rails  Entrance Stairs - Number of Steps: 3-4 keven side, 2 keven back, pt reports front steps very tall  Bathroom Shower/Tub: Tub/Shower unit  Home Equipment: Rolling walker  ADL Assistance: Independent  Homemaking Assistance: Independent  Ambulation Assistance: Independent  Transfer Assistance: Independent  Active : Yes  Occupation: Full time employment  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  Additional Comments: Near fall in bathroom a week ago when RLE gave out    Objective    AROM RLE (degrees)  RLE AROM: WFL  AROM LLE (degrees)  LLE AROM : WFL  Strength RLE  Strength RLE: WFL  Strength LLE  Strength LLE: WFL        Bed mobility  Supine to Sit: Supervision  Scooting: Supervision  Transfers  Sit to Stand: Stand by assistance  Stand to sit: Stand by assistance  Car Transfer: Stand by assistance  Ambulation  Ambulation?: Yes  Ambulation 1  Surface: level tile  Device: No Device  Assistance: Stand by assistance  Quality of Gait: increased medial lateral sway, wide CLARA, shuffling pattern with decreased step length  Distance: 100 feet x 2  Ambulation 2  Surface - 2: level tile  Device 2: Single point cane  Assistance 2: Supervision  Quality of Gait 2: steady gait, improved burke  Distance: 100'  Stairs  # Steps : 4  Rails: Right ascending  Assistance: Stand by assistance  Comment: steady, non-reciprocal pattern     Balance  Sitting - Static: Good  Sitting - Dynamic: Good  Standing - Static: Fair  Standing - Dynamic: [facility]  Times per week: 7  Times per day: Daily  Current Treatment Recommendations: Strengthening, Functional Mobility Training, Transfer Training, Gait Training, Stair training, Safety Education & Training, Patient/Caregiver Education & Training, Balance Training  Safety Devices  Type of devices: All fall risk precautions in place, Call light within reach, Nurse notified, Left in chair, Gait belt  Restraints  Initially in place: No      AM-PAC Score  AM-PAC Inpatient Mobility Raw Score : 18 (10/06/21 1305)  AM-PAC Inpatient T-Scale Score : 43.63 (10/06/21 1305)  Mobility Inpatient CMS 0-100% Score: 46.58 (10/06/21 1305)  Mobility Inpatient CMS G-Code Modifier : CK (10/06/21 1305)          Goals  Short term goals  Time Frame for Short term goals:  To be met prior to discharge  Short term goal 1: Bed mobility with mod I  Short term goal 2: Sit to/from stand with mod I  Short term goal 3: Ambulate 200' with SPC and mod I  Short term goal 4: Navigate up/down 1 flight of steps with rail and supervision  Short term goal 5: Navigate up/down 4 steps without rail and SPC and supervision  Short term goal 6: Car transfer with supervision  Patient Goals   Patient goals : to gohome       Therapy Time   Individual Concurrent Group Co-treatment   Time In 1002         Time Out 1040         Minutes 38         Timed Code Treatment Minutes: [address], PT    Thanks, Lucho Payne, PT, DPT [id number]

## 2021-10-06 NOTE — PROGRESS NOTES
Occupational Therapy   Occupational Therapy Initial Assessment  Date: 10/6/2021   Patient Name: Jacky Hogan  MRN: [id number]     : 1980    Date of Service: 10/6/2021    Discharge Recommendations: Jacky Hogan scored a 19/24 on the AM-PAC ADL Inpatient form. Current research shows that an AM-PAC score of 18 or greater is typically associated with a discharge to the patient's home setting. Based on the patient's AM-PAC score, and their current ADL deficits, it is recommended that the patient have 2-3 sessions per week of Occupational Therapy at d/c to increase the patient's independence. At this time, this patient demonstrates the endurance and safety to discharge home with home services (home vs OP services) and a follow up treatment frequency of 2-3x/wk. Please see assessment section for further patient specific details. If patient discharges prior to next session this note will serve as a discharge summary. Please see below for the latest assessment towards goals. HOME HEALTH CARE: LEVEL 1 STANDARD    - Initial home health evaluation to occur within 24-48 hours, in patient home   - Therapy to evaluate with goal of regaining prior level of functioning   - Therapy to evaluate if patient has [address] needs for personal care       OT Equipment Recommendations  Equipment Needed: Yes  Mobility Devices: ADL Assistive Devices  ADL Assistive Devices: Toileting - 3-in-1 Commode; Toileting - Raised Toilet Seat with arms  Other: Patient concerned about toileting as she only has a bathroom on second level of her home. She would benefit from a BSC as a means for toileting during the day on the first level of her home, and a RTS for use a night while on the second level of her home    Assessment   Performance deficits / Impairments: Decreased functional mobility ; Decreased endurance;Decreased ADL status; Decreased balance;Decreased high-level IADLs  Assessment: Patient presents slightly below baseline d/t above deficits; OT indicated to maximize safety/independence with ADL and IADL  Treatment Diagnosis: Above deficits associated with LUMBAR SPONDYLOLISTHESIS  Prognosis: Good  Decision Making: Medium Complexity  Exam: as above  OT Education: OT Role;Plan of Care;Precautions  Patient Education: eval, discharge--pt v/u  REQUIRES OT FOLLOW UP: Yes  Activity Tolerance  Activity Tolerance: Patient Tolerated treatment well;Patient limited by pain  Safety Devices  Safety Devices in place: Yes  Type of devices: All fall risk precautions in place; Left in chair;Nurse notified;Call light within reach;Gait belt (no alarm engaged upon arrival)           Patient Diagnosis(es): There were no encounter diagnoses. has a past medical history of Allergic rhinitis, Anxiety, Depression, Fibromyalgia, Hives, Hypertension, Hypothyroidism, Psoriasis, PVC (premature ventricular contraction), Raynaud disease, SLE (systemic lupus erythematosus) ([address] and SVT (supraventricular tachycardia) ([address] has a past surgical history that includes Tonsillectomy; Cholecystectomy; Lap Band; Cystoscopy; knee surgery (Right, 1999); Upper gastrointestinal endoscopy (2/12/2016); other surgical history (2/24/16); and lumbar fusion (Right, 10/5/2021). Treatment Diagnosis: Above deficits associated with LUMBAR SPONDYLOLISTHESIS      Restrictions  Restrictions/Precautions  Restrictions/Precautions: Fall Risk (high fall risk)  Required Braces or Orthoses?: No  Position Activity Restriction  Other position/activity restrictions: RIGHT LUMBAR4-LUMBAR5 TRANSVERSE LUMBAR INTERBODY FUSION    Subjective   General  Chart Reviewed: Yes  Diagnosis: LUMBAR SPONDYLOLISTHESIS  Subjective  Subjective: Sidelying on (L) side upon arrival, agreeable to evaluation.  Denies pain at rest  Patient Currently in Pain: Denies (Denies pain at rest)  Pre Treatment Pain Screening  Intervention List: Patient able to continue with treatment;Nurse/Physician

## 2021-10-06 NOTE — CARE COORDINATION
Discharge Planning:  I have reviewed the patient's medical history in detail and updated the computerized patient record. Patient independent prior to admission. There are no needs identified at this time. If something specific is identified, please notify Discharge Planner. HH will be coordinated at F/U apt with Dr. Maxim Rios.     Electronically signed by Robbi Garcia RN on 10/6/2021 at 8:32 AM

## 2021-10-06 NOTE — PLAN OF CARE
Problem: Pain:  Goal: Pain level will decrease  Description: Pain level will decrease  Outcome: Ongoing  Goal: Control of acute pain  Description: Control of acute pain  Outcome: Ongoing  Goal: Control of chronic pain  Description: Control of chronic pain  Outcome: Ongoing     Problem: Falls - Risk of:  Goal: Will remain free from falls  Description: Will remain free from falls  10/5/2021 2235 by Sadaf Verma RN  Outcome: Ongoing  10/5/2021 1512 by Samantha Kirby RN  Outcome: Ongoing  Goal: Absence of physical injury  Description: Absence of physical injury  10/5/2021 2235 by Sadaf Verma RN  Outcome: Ongoing  10/5/2021 1512 by Samantha Kirby RN  Outcome: Ongoing     Problem: Skin Integrity:  Goal: Will show no infection signs and symptoms  Description: Will show no infection signs and symptoms  Outcome: Ongoing  Goal: Absence of new skin breakdown  Description: Absence of new skin breakdown  Outcome: Ongoing     Problem: Safety:  Goal: Free from accidental physical injury  Description: Free from accidental physical injury  Outcome: Ongoing  Goal: Free from intentional harm  Description: Free from intentional harm  Outcome: Ongoing     Problem: Daily Care:  Goal: Daily care needs are met  Description: Daily care needs are met  10/5/2021 2235 by Sadaf Verma RN  Outcome: Ongoing  10/5/2021 1512 by Samantha Kirby RN  Outcome: Ongoing     Problem: Fluid Volume:  Goal: Maintenance of adequate hydration will improve  Description: Maintenance of adequate hydration will improve  Outcome: Ongoing     Problem: Health Behavior:  Goal: Identification of resources available to assist in meeting health care needs will improve  Description: Identification of resources available to assist in meeting health care needs will improve  Outcome: Ongoing     Problem: Pain:  Goal: Pain level will decrease  Description: Pain level will decrease  Outcome: Ongoing  Goal: Control of acute pain  Description: Control of acute pain  Outcome: Ongoing  Goal: Control of chronic pain  Description: Control of chronic pain  Outcome: Ongoing     Problem: Falls - Risk of:  Goal: Will remain free from falls  Description: Will remain free from falls  10/5/2021 2235 by German Flores RN  Outcome: Ongoing  10/5/2021 1512 by Conchita Munoz RN  Outcome: Ongoing  Goal: Absence of physical injury  Description: Absence of physical injury  10/5/2021 2235 by German Flores RN  Outcome: Ongoing  10/5/2021 1512 by Conchita Munoz RN  Outcome: Ongoing     Problem: Skin Integrity:  Goal: Will show no infection signs and symptoms  Description: Will show no infection signs and symptoms  Outcome: Ongoing  Goal: Absence of new skin breakdown  Description: Absence of new skin breakdown  Outcome: Ongoing     Problem: Safety:  Goal: Free from accidental physical injury  Description: Free from accidental physical injury  Outcome: Ongoing  Goal: Free from intentional harm  Description: Free from intentional harm  Outcome: Ongoing     Problem: Daily Care:  Goal: Daily care needs are met  Description: Daily care needs are met  10/5/2021 2235 by German Flores RN  Outcome: Ongoing  10/5/2021 1512 by Conchita Munoz RN  Outcome: Ongoing     Problem: Fluid Volume:  Goal: Maintenance of adequate hydration will improve  Description: Maintenance of adequate hydration will improve  Outcome: Ongoing     Problem: Health Behavior:  Goal: Identification of resources available to assist in meeting health care needs will improve  Description: Identification of resources available to assist in meeting health care needs will improve  Outcome: Ongoing     Problem: Pain:  Goal: Pain level will decrease  Description: Pain level will decrease  Outcome: Ongoing  Goal: Control of acute pain  Description: Control of acute pain  Outcome: Ongoing  Goal: Control of chronic pain  Description: Control of chronic pain  Outcome: Ongoing     Problem: Falls - Risk of:  Goal: Will remain free from falls  Description: Will remain free from falls  10/5/2021 2235 by Marilee Meckel, RN  Outcome: Ongoing  10/5/2021 1512 by Olimpia Young RN  Outcome: Ongoing  Goal: Absence of physical injury  Description: Absence of physical injury  10/5/2021 2235 by Marilee Meckel, RN  Outcome: Ongoing  10/5/2021 1512 by Olimpia Young RN  Outcome: Ongoing     Problem: Skin Integrity:  Goal: Will show no infection signs and symptoms  Description: Will show no infection signs and symptoms  Outcome: Ongoing  Goal: Absence of new skin breakdown  Description: Absence of new skin breakdown  Outcome: Ongoing     Problem: Safety:  Goal: Free from accidental physical injury  Description: Free from accidental physical injury  Outcome: Ongoing  Goal: Free from intentional harm  Description: Free from intentional harm  Outcome: Ongoing     Problem: Daily Care:  Goal: Daily care needs are met  Description: Daily care needs are met  10/5/2021 2235 by Marilee Meckel, RN  Outcome: Ongoing  10/5/2021 1512 by Olimpia Young RN  Outcome: Ongoing     Problem: Fluid Volume:  Goal: Maintenance of adequate hydration will improve  Description: Maintenance of adequate hydration will improve  Outcome: Ongoing     Problem: Health Behavior:  Goal: Identification of resources available to assist in meeting health care needs will improve  Description: Identification of resources available to assist in meeting health care needs will improve  Outcome: Ongoing

## 2021-10-06 NOTE — PROGRESS NOTES
Post-op Progress Note - Neurosurgery    Subjective:  Katja Martin is a 39 y.o. female. Pain is not controlled. Patient complains of incisional pain, denies leg/arm pain, denies headache, hoarseness of voice, difficulty swallowing, N/V. Objective:  Blood pressure 106/71, pulse 88, temperature 98.5 °F (36.9 °C), temperature source Oral, resp. rate 14, height 5' 8\" (1.727 m), weight 284 lb 0.8 oz (128.8 kg), SpO2 96 %, not currently breastfeeding. In: -   Out: 600 [Urine:600]    Physical Exam:  Incision:no significant drainage  Motor:Motor exam is symmetrical 5 out of 5 all extremities bilaterally  Activity: Ambulating in room independently    Labs:  BMP:   Lab Results   Component Value Date    GLUCOSE 145 10/06/2021    GLUCOSE 100 12/18/2013    CO2 22 10/06/2021    BUN 10 10/06/2021    CREATININE 0.6 10/06/2021    CALCIUM 8.6 10/06/2021     WBC/Hgb/Hct/Plts:  7.4/9.9/30.9/290 (10/06 0441)     Imaging:  Xrays: none    Assessment and Plan:  Principal Problem:    HTN (hypertension)  Active Problems:    Systemic lupus erythematosus (HCC)    Chronic GERD    Spondylolisthesis at L4-L5 level    Acute blood loss as cause of postoperative anemia    POD # 1 S/P L4-5 TLIF   OT/PT: Continue to advance activity. Disposition: Home tomorrow once pain better controlled  Internal Medicine consult per Dr. Elias Pac for medical management.     BINA Weir - CNP  10/6/2021

## 2021-10-07 VITALS
HEART RATE: 88 BPM | TEMPERATURE: 98.1 F | RESPIRATION RATE: 16 BRPM | HEIGHT: 68 IN | BODY MASS INDEX: 43.05 KG/M2 | OXYGEN SATURATION: 98 % | SYSTOLIC BLOOD PRESSURE: 118 MMHG | WEIGHT: 284.05 LBS | DIASTOLIC BLOOD PRESSURE: 74 MMHG

## 2021-10-07 LAB
ANION GAP SERPL CALCULATED.3IONS-SCNC: 11 MMOL/L (ref 3–16)
BASOPHILS ABSOLUTE: 0 K/UL (ref 0–0.2)
BASOPHILS RELATIVE PERCENT: 0.4 %
BUN BLDV-MCNC: 14 MG/DL (ref 7–20)
CALCIUM SERPL-MCNC: 8.6 MG/DL (ref 8.3–10.6)
CHLORIDE BLD-SCNC: 108 MMOL/L (ref 99–110)
CO2: 23 MMOL/L (ref 21–32)
CREAT SERPL-MCNC: 0.6 MG/DL (ref 0.6–1.1)
EOSINOPHILS ABSOLUTE: 0 K/UL (ref 0–0.6)
EOSINOPHILS RELATIVE PERCENT: 0.6 %
GFR AFRICAN AMERICAN: >60
GFR NON-AFRICAN AMERICAN: >60
GLUCOSE BLD-MCNC: 110 MG/DL (ref 70–99)
HCT VFR BLD CALC: 28.6 % (ref 36–48)
HEMOGLOBIN: 9.1 G/DL (ref 12–16)
LYMPHOCYTES ABSOLUTE: 0.9 K/UL (ref 1–5.1)
LYMPHOCYTES RELATIVE PERCENT: 15.5 %
MCH RBC QN AUTO: 27.3 PG (ref 26–34)
MCHC RBC AUTO-ENTMCNC: 31.8 G/DL (ref 31–36)
MCV RBC AUTO: 85.8 FL (ref 80–100)
MONOCYTES ABSOLUTE: 0.9 K/UL (ref 0–1.3)
MONOCYTES RELATIVE PERCENT: 14.6 %
NEUTROPHILS ABSOLUTE: 4.1 K/UL (ref 1.7–7.7)
NEUTROPHILS RELATIVE PERCENT: 68.9 %
PDW BLD-RTO: 17.7 % (ref 12.4–15.4)
PLATELET # BLD: 237 K/UL (ref 135–450)
PMV BLD AUTO: 8.7 FL (ref 5–10.5)
POTASSIUM SERPL-SCNC: 4.1 MMOL/L (ref 3.5–5.1)
RBC # BLD: 3.33 M/UL (ref 4–5.2)
SODIUM BLD-SCNC: 142 MMOL/L (ref 136–145)
WBC # BLD: 5.9 K/UL (ref 4–11)

## 2021-10-07 PROCEDURE — 36415 COLL VENOUS BLD VENIPUNCTURE: CPT

## 2021-10-07 PROCEDURE — 6370000000 HC RX 637 (ALT 250 FOR IP): Performed by: NURSE PRACTITIONER

## 2021-10-07 PROCEDURE — 97535 SELF CARE MNGMENT TRAINING: CPT

## 2021-10-07 PROCEDURE — 97530 THERAPEUTIC ACTIVITIES: CPT

## 2021-10-07 PROCEDURE — 85025 COMPLETE CBC W/AUTO DIFF WBC: CPT

## 2021-10-07 PROCEDURE — 6360000002 HC RX W HCPCS: Performed by: INTERNAL MEDICINE

## 2021-10-07 PROCEDURE — 97116 GAIT TRAINING THERAPY: CPT

## 2021-10-07 PROCEDURE — 6370000000 HC RX 637 (ALT 250 FOR IP): Performed by: NEUROLOGICAL SURGERY

## 2021-10-07 PROCEDURE — 80048 BASIC METABOLIC PNL TOTAL CA: CPT

## 2021-10-07 RX ORDER — POLYETHYLENE GLYCOL 3350 17 G/17G
17 POWDER, FOR SOLUTION ORAL DAILY
Qty: 527 G | Refills: 1 | COMMUNITY
Start: 2021-10-07 | End: 2021-11-06

## 2021-10-07 RX ORDER — OXYCODONE HYDROCHLORIDE 5 MG/1
5 TABLET ORAL EVERY 4 HOURS PRN
Qty: 42 TABLET | Refills: 0 | Status: SHIPPED | OUTPATIENT
Start: 2021-10-07 | End: 2021-10-14

## 2021-10-07 RX ORDER — METHOCARBAMOL 750 MG/1
750 TABLET, FILM COATED ORAL 4 TIMES DAILY
Qty: 40 TABLET | Refills: 0 | Status: SHIPPED | OUTPATIENT
Start: 2021-10-07 | End: 2021-10-17

## 2021-10-07 RX ADMIN — BUSPIRONE HYDROCHLORIDE 10 MG: 5 TABLET ORAL at 08:29

## 2021-10-07 RX ADMIN — POLYETHYLENE GLYCOL 3350 17 G: 17 POWDER, FOR SOLUTION ORAL at 08:30

## 2021-10-07 RX ADMIN — METHOCARBAMOL 750 MG: 750 TABLET ORAL at 08:29

## 2021-10-07 RX ADMIN — LOSARTAN POTASSIUM 25 MG: 25 TABLET, FILM COATED ORAL at 08:29

## 2021-10-07 RX ADMIN — OXYCODONE 10 MG: 5 TABLET ORAL at 04:58

## 2021-10-07 RX ADMIN — CETIRIZINE HYDROCHLORIDE 10 MG: 10 TABLET, FILM COATED ORAL at 08:30

## 2021-10-07 RX ADMIN — DILTIAZEM HYDROCHLORIDE 240 MG: 240 CAPSULE, COATED, EXTENDED RELEASE ORAL at 08:29

## 2021-10-07 RX ADMIN — HYDROMORPHONE HYDROCHLORIDE 1 MG: 1 INJECTION, SOLUTION INTRAMUSCULAR; INTRAVENOUS; SUBCUTANEOUS at 08:24

## 2021-10-07 RX ADMIN — HYDROMORPHONE HYDROCHLORIDE 1 MG: 1 INJECTION, SOLUTION INTRAMUSCULAR; INTRAVENOUS; SUBCUTANEOUS at 02:20

## 2021-10-07 RX ADMIN — PREDNISONE 5 MG: 5 TABLET ORAL at 08:29

## 2021-10-07 RX ADMIN — LEVOTHYROXINE SODIUM 100 MCG: 0.1 TABLET ORAL at 08:29

## 2021-10-07 RX ADMIN — FUROSEMIDE 40 MG: 40 TABLET ORAL at 08:30

## 2021-10-07 RX ADMIN — ACETAMINOPHEN 650 MG: 325 TABLET ORAL at 15:36

## 2021-10-07 RX ADMIN — PANTOPRAZOLE SODIUM 40 MG: 40 TABLET, DELAYED RELEASE ORAL at 08:29

## 2021-10-07 RX ADMIN — FLECAINIDE ACETATE 100 MG: 100 TABLET ORAL at 08:30

## 2021-10-07 RX ADMIN — METHOCARBAMOL 750 MG: 750 TABLET ORAL at 12:25

## 2021-10-07 RX ADMIN — ACETAMINOPHEN 650 MG: 325 TABLET ORAL at 08:29

## 2021-10-07 RX ADMIN — OXYCODONE 10 MG: 5 TABLET ORAL at 12:25

## 2021-10-07 RX ADMIN — ACETAMINOPHEN 650 MG: 325 TABLET ORAL at 04:53

## 2021-10-07 RX ADMIN — OXYCODONE 10 MG: 5 TABLET ORAL at 16:04

## 2021-10-07 RX ADMIN — SERTRALINE 100 MG: 50 TABLET, FILM COATED ORAL at 08:29

## 2021-10-07 ASSESSMENT — PAIN DESCRIPTION - LOCATION
LOCATION: BACK

## 2021-10-07 ASSESSMENT — PAIN DESCRIPTION - DESCRIPTORS
DESCRIPTORS: SORE;ACHING
DESCRIPTORS: SORE;ACHING

## 2021-10-07 ASSESSMENT — PAIN DESCRIPTION - PAIN TYPE
TYPE: SURGICAL PAIN

## 2021-10-07 ASSESSMENT — PAIN SCALES - GENERAL
PAINLEVEL_OUTOF10: 6
PAINLEVEL_OUTOF10: 7
PAINLEVEL_OUTOF10: 4
PAINLEVEL_OUTOF10: 7
PAINLEVEL_OUTOF10: 7
PAINLEVEL_OUTOF10: 8
PAINLEVEL_OUTOF10: 7
PAINLEVEL_OUTOF10: 8

## 2021-10-07 ASSESSMENT — PAIN DESCRIPTION - ORIENTATION
ORIENTATION: LOWER

## 2021-10-07 ASSESSMENT — PAIN DESCRIPTION - PROGRESSION
CLINICAL_PROGRESSION: NOT CHANGED
CLINICAL_PROGRESSION: GRADUALLY IMPROVING
CLINICAL_PROGRESSION: NOT CHANGED

## 2021-10-07 ASSESSMENT — PAIN DESCRIPTION - FREQUENCY
FREQUENCY: CONTINUOUS
FREQUENCY: CONTINUOUS

## 2021-10-07 ASSESSMENT — PAIN DESCRIPTION - ONSET
ONSET: ON-GOING
ONSET: ON-GOING

## 2021-10-07 NOTE — PROGRESS NOTES
Occupational Therapy  Facility/Department: [address] ORTHO/NEURO NURSING  Daily Treatment Note  NAME: Philippe Gamble  : 1980  MRN: [id number]    Date of Service: 10/7/2021    Discharge Recommendations:      Philippe Gamble scored a 21/24 on the AM-PAC ADL Inpatient form. Current research shows that an AM-PAC score of 18 or greater is typically associated with a discharge to the patient's home setting. Based on the patient's AM-PAC score, and their current ADL deficits, it is recommended that the patient have 2-3 sessions per week of Occupational Therapy at d/c to increase the patient's independence. At this time, this patient demonstrates the endurance and safety to discharge home with  (home vs OP services) and a follow up treatment frequency of 2-3x/wk. Please see assessment section for further patient specific details. If patient discharges prior to next session this note will serve as a discharge summary. Please see below for the latest assessment towards goals. HOME HEALTH CARE: LEVEL 1 STANDARD    - Initial home health evaluation to occur within 24-48 hours, in patient home   - Therapy to evaluate with goal of regaining prior level of functioning   - Therapy to evaluate if patient has [address] needs for personal care    OT Equipment Recommendations  Equipment Needed: Yes  Mobility Devices: ADL Assistive Devices  ADL Assistive Devices: Toileting - 3-in-1 Commode; Toileting - Raised Toilet Seat with arms  Other: Patient concerned about toileting as she only has a bathroom on second level of her home. She would benefit from a BSC as a means for toileting during the day on the first level of her home, and a RTS for use a night while on the second level of her home    Assessment   Performance deficits / Impairments: Decreased functional mobility ; Decreased endurance;Decreased ADL status; Decreased balance;Decreased high-level IADLs  Assessment: Patient presents slightly below baseline d/t above deficits; OT indicated to maximize safety/independence with ADL and IADL  Treatment Diagnosis: Above deficits associated with LUMBAR SPONDYLOLISTHESIS  Prognosis: Good  OT Education: OT Role;Plan of Care;Precautions; Equipment;ADL Adaptive Strategies;Transfer Training;Energy Conservation  Patient Education: Pt verbalized and demo'd understanding  Activity Tolerance  Activity Tolerance: Patient Tolerated treatment well  Safety Devices  Safety Devices in place: Yes  Type of devices: All fall risk precautions in place;Call light within reach;Gait belt;Patient at risk for falls; Left in chair;Nurse notified (no alarm engaged upon entry)         Patient Diagnosis(es): The encounter diagnosis was Spondylolisthesis at L4-L5 level.      has a past medical history of Allergic rhinitis, Anxiety, Depression, Fibromyalgia, Hives, Hypertension, Hypothyroidism, Psoriasis, PVC (premature ventricular contraction), Raynaud disease, SLE (systemic lupus erythematosus) ([address] and SVT (supraventricular tachycardia) ([address] has a past surgical history that includes Tonsillectomy; Cholecystectomy; Lap Band; Cystoscopy; knee surgery (Right, 1999); Upper gastrointestinal endoscopy (2/12/2016); other surgical history (2/24/16); and lumbar fusion (Right, 10/5/2021). Restrictions  Restrictions/Precautions  Restrictions/Precautions: Fall Risk (high fall risk)  Required Braces or Orthoses?: No  Position Activity Restriction  Other position/activity restrictions: RIGHT LUMBAR4-LUMBAR5 TRANSVERSE LUMBAR INTERBODY FUSION  Subjective   General  Chart Reviewed: Yes  Patient assessed for rehabilitation services?: Yes  Response to previous treatment: Patient with no complaints from previous session  Family / Caregiver Present: No  Diagnosis: LUMBAR SPONDYLOLISTHESIS  Subjective  Subjective: Pt seated EOB upon entry, agreeable to OT session. Reporting 5/10 pain in back at rest, 7/10 with movement.  Pt able to continue with session, pain medications administered following session      Orientation  Orientation  Overall Orientation Status: Within Functional Limits  Objective    ADL  Grooming: Independent  UE Dressing: Independent  LE Dressing: Independent  Toileting: None  Additional Comments: Pt performed item retrieval and transportation of all items to restroom to perform grooming tasks, UB/LB dressing tasks. Balance  Sitting Balance: Independent  Standing Balance: Independent  Standing Balance  Time: ~10 minutes  Activity: functional mobility to/from restroom, stance for ADL completion, transfers  Comment: no device performing ambulation short distances in room, no LOB  Functional Mobility  Functional - Mobility Device: No device  Activity: To/from bathroom  Assist Level: Independent     Transfers  Sit to stand: Independent  Stand to sit: Independent     Cognition  Overall Cognitive Status: WFL     Perception  Overall Perceptual Status: WFL     Plan   Plan  Times per week: 7  Times per day: Daily  Current Treatment Recommendations: Strengthening, Patient/Caregiver Education & Training, Equipment Evaluation, Education, & procurement, Balance Training, Functional Mobility Training, Endurance Training, Safety Education & Training, Self-Care / ADL    AM-PAC Score        AM-[facility] Inpatient Daily Activity Raw Score: 21 (10/07/21 1058)  AM-PAC Inpatient ADL T-Scale Score : 44.27 (10/07/21 1058)  ADL Inpatient CMS 0-100% Score: 32.79 (10/07/21 1058)  ADL Inpatient CMS G-Code Modifier : Rupesh Taveras (10/07/21 1058)    Goals  Short term goals  Time Frame for Short term goals: discharge  Short term goal 1: UB ADL mod I-goal met 10/7  Short term goal 2: LB ADL mod I-goal met 10/7  Short term goal 3: Fxl transfers mod I-goal met 10/7  Short term goal 4: Fxl mob mod I-goal met 10/7  Short term goal 5:  Toileting mod I-not addressed 10/7  Long term goals  Time Frame for Long term goals : LTG=STG       Therapy Time   Individual Concurrent Group Co-treatment   Time In 0800         Time Out 0823         Minutes 23            Timed Code Treatment Minutes:  23 Minutes  Total Treatment Minutes:  [address]    After reviewing treatment documentation, I am in agreement with this session.     AGUSTINA Salgado OTR/L [id number]

## 2021-10-07 NOTE — PROGRESS NOTES
0729: Assessment complete, VSS, pulses and sensation intact in all extremities, lumbar dressing CDI, pt c/o 7/10 pain, will give dilaudid, see MAR, discussed care plan, pt mutually agrees, call light and belongings in reach, will continue monitoring. 1235: Lumbar dressing changed, no drainage or redness noted, pt tolerated well, gave oxy and robaxin for pain, see MAR.    1415: pt resting in bed, states  is heading to pick her up from 45min away, no needs expressed at this time. 1550: d/c instructions reviewed, pt c/o pain 7/10, gave tylenol, will give oxy IR before pt d/c.       Agustín Gutierrez RN

## 2021-10-07 NOTE — CARE COORDINATION
Jann received a referral to this patient for a single point cane. SPC has been delivered to the patients room. Thank you for the referral.  Electronically signed by Orlando Sanders on 10/7/2021 at 9:53 AM  Cell ph# [phone]    NOTE: After 5:00 pm, Weekends, Holidays: Call [facility] [employer] at [phone] to coordinate delivery of home medical equipment.

## 2021-10-07 NOTE — PROGRESS NOTES
Consult Progress Note - Dr. Gina Barr - Internal Medicine    Referring MD: Elisabet Mcmanus MD  PCP: MD Caroline [address] / Andre Rahman [phone]  Hospital Day: 2  Code Status: Full Code  Current Diet: ADULT DIET; Regular    CC: follow up on medical issues    Subjective:   Shen Ramesh is a 39 y.o. female. she denies problems    Doing ok  Pain issues better    Pain is controlled. Doing well with therapy. Patient is tolerating diet. Pt denies chest pain, denies shortness of breath, denies nausea,  denies emesis. I have reviewed the patient's medical and social history in detail and updated the computerized patient record.      Active Hospital Problems    Diagnosis Date Noted    Systemic lupus erythematosus ([facility] [address] [M32.9] 05/04/2011     Priority: High    Acute blood loss as cause of postoperative anemia [D62] 10/06/2021    Spondylolisthesis at L4-L5 level [M43.16] 10/05/2021    HTN (hypertension) [I10] 10/05/2021    Chronic GERD [K21.9] 02/12/2016       Current Facility-Administered Medications: diphenhydrAMINE (BENADRYL) tablet 25 mg, 25 mg, Oral, Q6H PRN  acetaminophen (TYLENOL) tablet 650 mg, 650 mg, Oral, Q6H  sertraline (ZOLOFT) tablet 100 mg, 100 mg, Oral, Daily  ondansetron (ZOFRAN-ODT) disintegrating tablet 8 mg, 8 mg, Oral, Q8H PRN  zolpidem (AMBIEN) tablet 5 mg, 5 mg, Oral, Nightly PRN  losartan (COZAAR) tablet 25 mg, 25 mg, Oral, Daily  pantoprazole (PROTONIX) tablet 40 mg, 40 mg, Oral, Daily  predniSONE (DELTASONE) tablet 5 mg, 5 mg, Oral, Daily  furosemide (LASIX) tablet 40 mg, 40 mg, Oral, Daily  flecainide (TAMBOCOR) tablet 100 mg, 100 mg, Oral, BID  dilTIAZem (CARDIZEM CD) extended release capsule 240 mg, 240 mg, Oral, Daily  cetirizine (ZYRTEC) tablet 10 mg, 10 mg, Oral, Daily  busPIRone (BUSPAR) tablet 10 mg, 10 mg, Oral, BID  hydrOXYzine (ATARAX) tablet 25 mg, 25 mg, Oral, Q6H PRN  levothyroxine (SYNTHROID) tablet 100 mcg, 100 mcg, Oral, Daily  [facility]on[facility]-Eth Jesenia Thompson hours) at 10/7/2021 0947  Last data filed at 10/7/2021 0459  Gross per 24 hour   Intake 1000 ml   Output 1300 ml   Net -300 ml         Physical Exam:  /74   Pulse 88   Temp 98.1 °F (36.7 °C) (Oral)   Resp 16   Ht 5' 8\" (1.727 m)   Wt 284 lb 0.8 oz (128.8 kg)   SpO2 98%   BMI 43.19 kg/m²   General appearance: alert, appears stated age and cooperative  Head: Normocephalic, without obvious abnormality, atraumatic  Lungs: clear to auscultation bilaterally  Heart: regular rate and rhythm, S1, S2 normal, no murmur, click, rub or gallop  Abdomen: soft, non-tender; bowel sounds normal; no masses,  no organomegaly  Extremities: extremities normal, atraumatic, no cyanosis or edema    Labs:  Lab Results   Component Value Date    WBC 5.9 10/07/2021    HGB 9.1 (L) 10/07/2021    HCT 28.6 (L) 10/07/2021     10/07/2021    CHOL 189 02/17/2016    TRIG 98 02/17/2016    HDL 57 02/17/2016    ALT 11 02/17/2016    AST 17 02/17/2016     10/07/2021    K 4.1 10/07/2021     10/07/2021    CREATININE 0.6 10/07/2021    BUN 14 10/07/2021    CO2 23 10/07/2021    TSH 4.87 (H) 02/17/2016    INR 0.90 09/29/2021    LABA1C 5.4 02/17/2016    LABMICR YES 04/15/2014     No results found for: CKTOTAL, CKMB, CKMBINDEX, TROPONINI    Imaging: All recent imaging studies reviewed in computerized chart. XR LUMBAR SPINE (2-3 VIEWS)    Result Date: 10/5/2021  EXAMINATION: 2 intraoperative SPOT FLUOROSCOPIC IMAGES and 2 intraoperative CTs 10/5/2021 7:24 am TECHNIQUE: Fluoroscopy was provided by the radiology department for procedure. Radiologist was not present during examination. FLUOROSCOPY DOSE AND TYPE OR TIME AND EXPOSURES: 9 seconds, 389 images (2 fluoro images and 2 intraoperative CTs). Total dose 1372.6 mGy. COMPARISON: None HISTORY: Intraprocedural imaging. Right lumbar L4-L5 transverse interbody fusion. FINDINGS: 2 spot images and 2 CT images of the lower lumbar spine were obtained. Radiologist was not in attendance. Images demonstrate placement of bilateral pedicle screws at L4 and L5. Intraprocedural fluoroscopic spot images and CTs as above. See separate procedure report for more information. FLUORO FOR SURGICAL PROCEDURES    Result Date: 10/5/2021  Radiology exam is complete. No Radiologist dictation. Please follow up with ordering provider. Lab Results   Component Value Date    GLUCOSE 110 10/07/2021    GLUCOSE 100 12/18/2013     Lab Results   Component Value Date    POCGLU 70 02/24/2016       Assessment and Plan:  Principal Problem:    HTN (hypertension) -Established problem. Stable. 118/74  Plan: stay on same meds  Active Problems:    Systemic lupus erythematosus ([facility] [address] -Established problem. Stable. Plan: Continue present orders/plan. Chronic GERD -Established problem. Stable. No issues  Plan: cont on ppi    Spondylolisthesis at L4-L5 level -Established problem. Stable. Some pain issues overnight  Plan: stay on post op pathway. To work with pt/ot today. Transition to oral pain meds. Cont to follow h/h to assess post op anemia. Acute blood loss as cause of postoperative anemia -Established problem. Stable.  hgb 9.1  Plan: No indication for transfusion. Cont to monitor h/h to assess progression of anemia. Recommend ferrous sulfate or MVI as outpatient.      Disp - medically stable      Chet Jasso MD  10/7/2021

## 2021-10-07 NOTE — PROGRESS NOTES
Physical Therapy  Facility/Department: [address] ORTHO/NEURO NURSING  Daily Treatment Note  NAME: Charmian Primrose  : 1980  MRN: [id number]    Date of Service: 10/7/2021    Discharge Recommendations:  Charmian Primrose scored a 23/24 on the AM-PAC short mobility form. Current research shows that an AM-PAC score of 18 or greater is typically associated with a discharge to the patient's home setting. Based on the patient's AM-PAC score and their current functional mobility deficits, it is recommended that the patient have 2-3 sessions per week of Physical Therapy at d/c to increase the patient's independence. At this time, this patient demonstrates the endurance and safety to discharge home with home vs services and a follow up treatment frequency of 2-3x/wk. Please see assessment section for further patient specific details. If patient discharges prior to next session this note will serve as a discharge summary. Please see below for the latest assessment towards goals. S Level 1   PT Equipment Recommendations  Equipment Needed: Yes  Cane: Straight Cane  Other: Might require SPC for safe ambulation in some instances upon discharge    Assessment   Body structures, Functions, Activity limitations: Decreased functional mobility ; Decreased endurance;Decreased balance  Assessment: Despite significant improvement, patient not at baseline function and would benefit from skilled PT to address above deficits and facilitate return to baseline function. Patient Mod I for transfers and ambulation without a device.  Recommend home PT to adress concerns  Treatment Diagnosis: decreased functional mobility, impaird gait, decreased balance s/p lumbar spine surgery  Prognosis: Good  Decision Making: Medium Complexity  Clinical Presentation: evolving  PT Education: PT Role;Goals;Plan of Care;Precautions;Gait Training;Transfer Training  Patient Education: safety with mobility, use of SPC, d/c recommendations - verbalized understanding  Barriers to Learning: none  REQUIRES PT FOLLOW UP: Yes  Activity Tolerance  Activity Tolerance: Patient Tolerated treatment well     Patient Diagnosis(es): The encounter diagnosis was Spondylolisthesis at L4-L5 level.     has a past medical history of Allergic rhinitis, Anxiety, Depression, Fibromyalgia, Hives, Hypertension, Hypothyroidism, Psoriasis, PVC (premature ventricular contraction), Raynaud disease, SLE (systemic lupus erythematosus) ([facility] [address] and SVT (supraventricular tachycardia) ([facility] [address] has a past surgical history that includes Tonsillectomy; Cholecystectomy; Lap Band; Cystoscopy; knee surgery (Right, 1999); Upper gastrointestinal endoscopy (2/12/2016); other surgical history (2/24/16); and lumbar fusion (Right, 10/5/2021). Restrictions  Restrictions/Precautions  Restrictions/Precautions: Fall Risk (high fall risk)  Required Braces or Orthoses?: No  Position Activity Restriction  Other position/activity restrictions: RIGHT LUMBAR4-LUMBAR5 TRANSVERSE LUMBAR INTERBODY FUSION  Subjective   General  Chart Reviewed: Yes  Family / Caregiver Present: No  Subjective  Subjective: Denied pain at rest. Agreeable to therapy. No alarm present.   General Comment  Comments: Sitting in chair upon arrival.   Orientation  Orientation  Overall Orientation Status: Within Functional Limits  Cognition   Objective   Transfers  Sit to Stand: Supervision;Modified independent (regressed to Mod I)  Stand to sit: Supervision;Modified independent (regressed to Mod I)  Car Transfer: Supervision  Comment: after cuing for posture, pt improved tolerance for sit<>stands; performed 5x  Ambulation  Ambulation?: Yes  Ambulation 1  Surface: level tile  Device: Single point cane  Assistance: Stand by assistance  Quality of Gait: increased medial lateral sway, wide CLARA, shuffling pattern with decreased step length  Gait Deviations: Decreased arm swing  Distance: 50'  Comments: took [facility] from patient after cues were not effective. Ambulation 2  Surface - 2: level tile  Device 2: No device  Assistance 2: Supervision;Modified Independent  Quality of Gait 2: steady gait, improved burke  Distance: 750'  Comments: VC for upright posture  Stairs  # Steps : 4  Stairs Height: 6\"  Rails: Right ascending  Device: No Device  Assistance: Stand by assistance  Comment: steady, reciprocal pattern. ascend/descend 4 6\" steps with SPC SBA VC for sequencing - step to pattern. ascend/descend 10\" step with SPC CGA with VC for SPC placement and sequencing - step to pattern. Balance  Posture: Fair  Sitting - Static: Good  Sitting - Dynamic: Good  Standing - Static: Good  Standing - Dynamic: Good;-  Exercises  Gluteal Sets: x10 B  Comments: mini-squats x10 B    Comment: educated on use of SPC, RW, and no device. Recommendd use of smaller stairs upon arrival home. AM-PAC Score  AM-PAC Inpatient Mobility Raw Score : 23 (10/07/21 1010)  AM-PAC Inpatient T-Scale Score : 56.93 (10/07/21 1010)  Mobility Inpatient CMS 0-100% Score: 11.2 (10/07/21 1010)  Mobility Inpatient CMS G-Code Modifier : CI (10/07/21 1010)  Goals  Short term goals  Time Frame for Short term goals: To be met prior to discharge  Short term goal 1: Bed mobility with mod I  Short term goal 2: Sit to/from stand with mod I - MET 10/7  Short term goal 3: Ambulate 200' with SPC and mod I  Short term goal 4: Navigate up/down 1 flight of steps with rail and supervision  Short term goal 5: Navigate up/down 4 steps without rail and SPC and supervision  Short term goal 6: Car transfer with supervision - MET 10/7  Patient Goals   Patient goals : to [facility]  Times per week: 7  Times per day: Daily  Current Treatment Recommendations: Strengthening, Functional Mobility Training, Transfer Training, Gait Training, Stair training, Safety Education & Training, Patient/Caregiver Education & Training, Balance Training  Safety Devices  Type of devices:  All fall risk precautions in place, Call light within reach, Nurse notified, Left in chair, Gait belt  Restraints  Initially in place: No   Therapy Time   Individual Concurrent Group Co-treatment   Time In 0924         Time Out 0949         Minutes 25         Timed Code Treatment Minutes: Dipesh, [address]     I agree with the above note. Goals addressed by PT.   Tonya, Carlos Fernandez, DPT [id number]

## 2021-10-07 NOTE — PROGRESS NOTES
PM assessment completed. Pt resting in bed, vitals stable. Pt reporting surgical pain, medicated per MAR with PRN dilaudid. Surgical dressing changed at this time. Incision clean with scant drainage, steri strips remain intact. Light gauze dressing placed, covered with tegaderm. No further needs voiced at this time. Fall precautions in place, hourly rounding, call light and belongings in reach, bed in lowest position, wheels locked in place, side rails up x 2, walkways free of clutter. Pt up independently as tolerated in room and mandujano.

## 2021-10-07 NOTE — DISCHARGE SUMMARY
Lab Results   Component Value Date    WBC 5.9 10/07/2021    RBC 3.33 10/07/2021    HGB 9.1 10/07/2021    HCT 28.6 10/07/2021     10/07/2021    MCV 85.8 10/07/2021    MCH 27.3 10/07/2021    MCHC 31.8 10/07/2021    RDW 17.7 10/07/2021    SEGSPCT 72 12/18/2013    LYMPHOPCT 15.5 10/07/2021    MONOPCT 14.6 10/07/2021    EOSPCT 6 02/27/2012    BASOPCT 0.4 10/07/2021    MONOSABS 0.9 10/07/2021    LYMPHSABS 0.9 10/07/2021    EOSABS 0.0 10/07/2021    BASOSABS 0.0 10/07/2021    DIFFTYPE Auto-K 01/22/2013     CMP:    Lab Results   Component Value Date     10/07/2021    K 4.1 10/07/2021     10/07/2021    CO2 23 10/07/2021    BUN 14 10/07/2021    CREATININE 0.6 10/07/2021    GFRAA >60 10/07/2021    GFRAA >60 01/22/2013    AGRATIO 1.0 02/17/2016    LABGLOM >60 10/07/2021    LABGLOM 114 05/25/2011    LABGLOM 94 05/25/2011    GLUCOSE 110 10/07/2021    GLUCOSE 100 12/18/2013    PROT 7.8 02/17/2016    PROT 7.1 01/22/2013    LABALBU 3.9 02/17/2016    LABALBU 4.0 05/30/2012    CALCIUM 8.6 10/07/2021    BILITOT <0.2 02/17/2016    BILITOT 0.2 12/18/2013    ALKPHOS 80 02/17/2016    AST 17 02/17/2016    ALT 11 02/17/2016      Discharge Medications:      Medication List      START taking these medications    oxyCODONE 5 MG immediate release tablet  Commonly known as: ROXICODONE  Take 1 tablet by mouth every 4 hours as needed for Pain for up to 7 days. polyethylene glycol 17 g packet  Commonly known as: GLYCOLAX  Take 17 g by mouth daily        CHANGE how you take these medications    aspirin 81 MG tablet  Take 1 tablet by mouth daily  Start taking on: October 12, 2021  What changed: These instructions start on October 12, 2021. If you are unsure what to do until then, ask your doctor or other care provider.      methocarbamol 750 MG tablet  Commonly known as: ROBAXIN  Take 1 tablet by mouth 4 times daily for 10 days  What changed:   · medication strength  · how much to take  · when to take this  · reasons to take this        CONTINUE taking these medications    Benlysta 120 MG injection  Generic drug: belimumab     busPIRone 10 MG tablet  Commonly known as: BUSPAR     Cardizem  MG extended release capsule  Generic drug: dilTIAZem     cetirizine 10 MG tablet  Commonly known as: ZYRTEC     flecainide 100 MG tablet  Commonly known as: TAMBOCOR     furosemide 40 MG tablet  Commonly known as: LASIX     hydroxychloroquine 200 MG tablet  Commonly known as: Plaquenil  Take 1 tablet by mouth 2 times daily. hydrOXYzine 25 MG tablet  Commonly known as: ATARAX     levothyroxine 100 MCG tablet  Commonly known as: SYNTHROID     lidocaine viscous 2 % solution  Commonly known as: XYLOCAINE  Take 5 mLs by mouth 3 times daily as needed for Pain.     losartan 25 MG tablet  Commonly known as: COZAAR     multivitamin per tablet     Myzilra 50-30/75-40/ 125-30 MCG Tabs  Generic drug: Levonorg-Eth Estrad Triphasic     omeprazole 40 MG delayed release capsule  Commonly known as: PRILOSEC     ondansetron 4 MG disintegrating tablet  Commonly known as: Zofran ODT  Take 2 tablets by mouth every 8 hours as needed for Nausea     predniSONE 5 MG tablet  Commonly known as: DELTASONE  Take 1 tablet by mouth daily.      sertraline 100 MG tablet  Commonly known as: ZOLOFT     zolpidem 5 MG tablet  Commonly known as: AMBIEN        STOP taking these medications    ibuprofen 800 MG tablet  Commonly known as: ADVIL;MOTRIN     tiZANidine 4 MG capsule  Commonly known as: Megha Callaway           Where to Get Your Medications      These medications were sent to [facility] [address], [facility] [phone] Jonna Dozier [phone]  [address] [address]    Phone: [phone]   · methocarbamol 750 MG tablet  · oxyCODONE 5 MG immediate release tablet     You can get these medications from any pharmacy    You don't need a prescription for these medications  · polyethylene glycol 17 g packet     Information about where to get these medications is not yet available    Ask your nurse or doctor about these medications  · aspirin 81 MG tablet       Discharge Destination: The patient was discharged to Home. Follow-up: The patient is to follow-up with our office in the office in 2-3 weeks. Discharge Instructions: Verbal and written discharge instructions as well as dressing change instructions were given to the patient at the time of consent. No anticoagulation for 1 week post-operatively. No driving. No lifting or bending.       BINA Vasques CNP  10/7/2021

## 2021-10-07 NOTE — PLAN OF CARE
Problem: Pain:  Goal: Pain level will decrease  Description: Pain level will decrease  Outcome: Ongoing  Pt has PRN pain medication available upon request. Pt aware to let nursing know when pain medication is needed. Problem: Falls - Risk of:  Goal: Will remain free from falls  Description: Will remain free from falls  Outcome: Ongoing  Fall precautions in place, hourly rounding, call light and belongings in reach, bed in lowest position, wheels locked in place, side rails up x 2, walkways free of clutter       Problem: Skin Integrity:  Goal: Will show no infection signs and symptoms  Description: Will show no infection signs and symptoms  Outcome: Ongoing  Skin remains dry and intact. Patient encouraged to reposition every 2 hours and is assisted to do so when unable to reposition independently.

## (undated) DEVICE — SUTURE MCRYL + SZ 4-0 L27IN ABSRB UD L19MM PS-2 3/8 CIR MCP426H

## (undated) DEVICE — MERCY FAIRFIELD TURNOVER KIT: Brand: MEDLINE INDUSTRIES, INC.

## (undated) DEVICE — 3M™ STERI-STRIP™ REINFORCED ADHESIVE SKIN CLOSURES, R1547, 1/2 IN X 4 IN (12 MM X 100 MM), 6 STRIPS/ENVELOPE: Brand: 3M™ STERI-STRIP™

## (undated) DEVICE — TOWEL,OR,DSP,ST,BLUE,DLX,10/PK,8PK/CS: Brand: MEDLINE

## (undated) DEVICE — PROTECTOR EYE PT SELF ADH NS OPT GRD LF

## (undated) DEVICE — Device: Brand: SNAP ON SPHERZ

## (undated) DEVICE — TOTAL TRAY, DB, 100% SILI FOLEY, 16FR 10: Brand: MEDLINE

## (undated) DEVICE — ELECTRODE PT RET AD L9FT HI MOIST COND ADH HYDRGEL CORDED

## (undated) DEVICE — CAP SPNL SCREW TAB FOR EXT CD HORZ VOYAGER DISP
Type: IMPLANTABLE DEVICE | Site: SPINE LUMBAR | Status: NON-FUNCTIONAL
Removed: 2021-10-05

## (undated) DEVICE — DRAPE,LAP,CHOLE,W/TROUGHS,STERILE: Brand: MEDLINE

## (undated) DEVICE — LOTION PREP REMV 5OZ IODO CLR TINC OF BENZ DURAPREP

## (undated) DEVICE — BLANKET WRM W29.9XL79.1IN UP BODY FORC AIR MISTRAL-AIR

## (undated) DEVICE — POSITIONER HD SFT TCH BERRY FOAM DEVON

## (undated) DEVICE — APPLICATOR PREP 26ML 0.7% IOD POVACRYLEX 74% ISO ALC ST

## (undated) DEVICE — SUTURE VCRL + SZ 2-0 L18IN ABSRB UD CT1 L36MM 1/2 CIR VCP839D

## (undated) DEVICE — 3M™ TEGADERM™ TRANSPARENT FILM DRESSING FRAME STYLE, 1628, 6 IN X 8 IN (15 CM X 20 CM), 10/CT 8CT/CASE: Brand: 3M™ TEGADERM™

## (undated) DEVICE — GAUZE,SPONGE,4"X4",8PLY,STRL,LF,10/TRAY: Brand: MEDLINE

## (undated) DEVICE — EXTENDER 6642007 TAB EXT

## (undated) DEVICE — DRAPE MICSCP W132XL406CM LENS DIA68MM W VARI LENS2 FOR LEICA

## (undated) DEVICE — SHEET,DRAPE,53X77,STERILE: Brand: MEDLINE

## (undated) DEVICE — NEURO HBO 85949

## (undated) DEVICE — SUTURE VCRL + SZ 0 L18IN ABSRB UD L36MM CT-1 1/2 CIR VCP840D

## (undated) DEVICE — SOLUTION IRRIG 1000ML 0.9% SOD CHL USP POUR PLAS BTL

## (undated) DEVICE — PIN 9733236 150MM STERILE PERC REF

## (undated) DEVICE — 3.0MM NEURO (MATCH HEAD)

## (undated) DEVICE — SURE SET SINGLE BASIN-LF: Brand: MEDLINE INDUSTRIES, INC.

## (undated) DEVICE — 3.0MM PRECISION NEURO (MATCH HEAD) EXTENDED

## (undated) DEVICE — TRAY PREP DRY W/ PREM GLV 2 APPL 6 SPNG 2 UNDPD 1 OVERWRAP

## (undated) DEVICE — GOWNS CAPE 3 PLY WHT DISPOSABLE 30 X 42INCH

## (undated) DEVICE — Device

## (undated) DEVICE — COVER,TABLE,77X90,STERILE: Brand: MEDLINE

## (undated) DEVICE — STERILE LATEX POWDER-FREE SURGICAL GLOVESWITH NITRILE AND EMOLLIENT COATINGS: Brand: PROTEXIS

## (undated) DEVICE — GAUZE,SPONGE,4"X4",16PLY,XRAY,STRL,LF: Brand: MEDLINE

## (undated) DEVICE — ARM CRADLE: Brand: DEVON